# Patient Record
Sex: FEMALE | Race: WHITE | Employment: OTHER | URBAN - METROPOLITAN AREA
[De-identification: names, ages, dates, MRNs, and addresses within clinical notes are randomized per-mention and may not be internally consistent; named-entity substitution may affect disease eponyms.]

---

## 2019-04-16 ENCOUNTER — OFFICE VISIT (OUTPATIENT)
Dept: FAMILY MEDICINE CLINIC | Age: 64
End: 2019-04-16
Payer: MEDICARE

## 2019-04-16 VITALS
TEMPERATURE: 98 F | SYSTOLIC BLOOD PRESSURE: 132 MMHG | BODY MASS INDEX: 35.12 KG/M2 | DIASTOLIC BLOOD PRESSURE: 78 MMHG | RESPIRATION RATE: 20 BRPM | HEART RATE: 112 BPM | WEIGHT: 186 LBS | HEIGHT: 61 IN

## 2019-04-16 DIAGNOSIS — R10.31 RIGHT LOWER QUADRANT ABDOMINAL PAIN: ICD-10-CM

## 2019-04-16 DIAGNOSIS — Z12.11 SCREEN FOR COLON CANCER: ICD-10-CM

## 2019-04-16 DIAGNOSIS — N30.01 ACUTE CYSTITIS WITH HEMATURIA: Primary | ICD-10-CM

## 2019-04-16 DIAGNOSIS — Z00.00 ROUTINE GENERAL MEDICAL EXAMINATION AT A HEALTH CARE FACILITY: ICD-10-CM

## 2019-04-16 DIAGNOSIS — R30.0 DYSURIA: ICD-10-CM

## 2019-04-16 DIAGNOSIS — Z12.31 SCREENING MAMMOGRAM, ENCOUNTER FOR: ICD-10-CM

## 2019-04-16 LAB
ALBUMIN SERPL-MCNC: 3.6 G/DL (ref 3.5–5.1)
ALP BLD-CCNC: 74 U/L (ref 38–126)
ALT SERPL-CCNC: 12 U/L (ref 11–66)
ANION GAP SERPL CALCULATED.3IONS-SCNC: 17 MEQ/L (ref 8–16)
AST SERPL-CCNC: 34 U/L (ref 5–40)
BASOPHILS # BLD: 0.2 %
BASOPHILS ABSOLUTE: 0 THOU/MM3 (ref 0–0.1)
BILIRUB SERPL-MCNC: 0.7 MG/DL (ref 0.3–1.2)
BILIRUBIN, POC: ABNORMAL
BLOOD URINE, POC: ABNORMAL
BUN BLDV-MCNC: 15 MG/DL (ref 7–22)
CALCIUM SERPL-MCNC: 9.5 MG/DL (ref 8.5–10.5)
CHLORIDE BLD-SCNC: 97 MEQ/L (ref 98–111)
CHOLESTEROL, TOTAL: 155 MG/DL (ref 100–199)
CLARITY, POC: ABNORMAL
CO2: 24 MEQ/L (ref 23–33)
COLOR, POC: ABNORMAL
CREAT SERPL-MCNC: 1 MG/DL (ref 0.4–1.2)
EOSINOPHIL # BLD: 0.1 %
EOSINOPHILS ABSOLUTE: 0 THOU/MM3 (ref 0–0.4)
ERYTHROCYTE [DISTWIDTH] IN BLOOD BY AUTOMATED COUNT: 12.9 % (ref 11.5–14.5)
ERYTHROCYTE [DISTWIDTH] IN BLOOD BY AUTOMATED COUNT: 38.5 FL (ref 35–45)
GFR SERPL CREATININE-BSD FRML MDRD: 55 ML/MIN/1.73M2
GLUCOSE BLD-MCNC: 109 MG/DL (ref 70–108)
GLUCOSE URINE, POC: ABNORMAL
HCT VFR BLD CALC: 39.6 % (ref 37–47)
HDLC SERPL-MCNC: 44 MG/DL
HEMOGLOBIN: 12.5 GM/DL (ref 12–16)
IMMATURE GRANS (ABS): 0.1 THOU/MM3 (ref 0–0.07)
IMMATURE GRANULOCYTES: 0.7 %
KETONES, POC: ABNORMAL
LDL CHOLESTEROL CALCULATED: 92 MG/DL
LEUKOCYTE EST, POC: ABNORMAL
LYMPHOCYTES # BLD: 6.9 %
LYMPHOCYTES ABSOLUTE: 1 THOU/MM3 (ref 1–4.8)
MCH RBC QN AUTO: 25.8 PG (ref 26–33)
MCHC RBC AUTO-ENTMCNC: 31.6 GM/DL (ref 32.2–35.5)
MCV RBC AUTO: 81.8 FL (ref 81–99)
MONOCYTES # BLD: 7 %
MONOCYTES ABSOLUTE: 1 THOU/MM3 (ref 0.4–1.3)
NITRITE, POC: POSITIVE
NUCLEATED RED BLOOD CELLS: 0 /100 WBC
PH, POC: 5.5
PLATELET # BLD: 439 THOU/MM3 (ref 130–400)
PMV BLD AUTO: 9 FL (ref 9.4–12.4)
POTASSIUM SERPL-SCNC: 4.5 MEQ/L (ref 3.5–5.2)
PROTEIN, POC: ABNORMAL
RBC # BLD: 4.84 MILL/MM3 (ref 4.2–5.4)
SEG NEUTROPHILS: 85.1 %
SEGMENTED NEUTROPHILS ABSOLUTE COUNT: 12.4 THOU/MM3 (ref 1.8–7.7)
SODIUM BLD-SCNC: 138 MEQ/L (ref 135–145)
SPECIFIC GRAVITY, POC: >=1.03
TOTAL PROTEIN: 7.8 G/DL (ref 6.1–8)
TRIGL SERPL-MCNC: 96 MG/DL (ref 0–199)
UROBILINOGEN, POC: 0.2
WBC # BLD: 14.6 THOU/MM3 (ref 4.8–10.8)

## 2019-04-16 PROCEDURE — 99204 OFFICE O/P NEW MOD 45 MIN: CPT | Performed by: FAMILY MEDICINE

## 2019-04-16 PROCEDURE — 36415 COLL VENOUS BLD VENIPUNCTURE: CPT | Performed by: FAMILY MEDICINE

## 2019-04-16 PROCEDURE — 1123F ACP DISCUSS/DSCN MKR DOCD: CPT | Performed by: FAMILY MEDICINE

## 2019-04-16 PROCEDURE — 4040F PNEUMOC VAC/ADMIN/RCVD: CPT | Performed by: FAMILY MEDICINE

## 2019-04-16 PROCEDURE — 1036F TOBACCO NON-USER: CPT | Performed by: FAMILY MEDICINE

## 2019-04-16 PROCEDURE — G0009 ADMIN PNEUMOCOCCAL VACCINE: HCPCS | Performed by: FAMILY MEDICINE

## 2019-04-16 PROCEDURE — G8400 PT W/DXA NO RESULTS DOC: HCPCS | Performed by: FAMILY MEDICINE

## 2019-04-16 PROCEDURE — G8417 CALC BMI ABV UP PARAM F/U: HCPCS | Performed by: FAMILY MEDICINE

## 2019-04-16 PROCEDURE — 3017F COLORECTAL CA SCREEN DOC REV: CPT | Performed by: FAMILY MEDICINE

## 2019-04-16 PROCEDURE — 81003 URINALYSIS AUTO W/O SCOPE: CPT | Performed by: FAMILY MEDICINE

## 2019-04-16 PROCEDURE — G8427 DOCREV CUR MEDS BY ELIG CLIN: HCPCS | Performed by: FAMILY MEDICINE

## 2019-04-16 PROCEDURE — 90670 PCV13 VACCINE IM: CPT | Performed by: FAMILY MEDICINE

## 2019-04-16 PROCEDURE — 1090F PRES/ABSN URINE INCON ASSESS: CPT | Performed by: FAMILY MEDICINE

## 2019-04-16 RX ORDER — LEVOTHYROXINE SODIUM 88 UG/1
88 TABLET ORAL DAILY
COMMUNITY
Start: 2019-01-17

## 2019-04-16 RX ORDER — NITROFURANTOIN 25; 75 MG/1; MG/1
100 CAPSULE ORAL 2 TIMES DAILY
Qty: 20 CAPSULE | Refills: 0 | Status: SHIPPED | OUTPATIENT
Start: 2019-04-16 | End: 2019-04-26

## 2019-04-16 ASSESSMENT — ENCOUNTER SYMPTOMS
SORE THROAT: 0
VOMITING: 0
CONSTIPATION: 0
DIARRHEA: 0
ABDOMINAL PAIN: 1
NAUSEA: 0
BACK PAIN: 0
RHINORRHEA: 0

## 2019-04-16 ASSESSMENT — PATIENT HEALTH QUESTIONNAIRE - PHQ9
2. FEELING DOWN, DEPRESSED OR HOPELESS: 0
1. LITTLE INTEREST OR PLEASURE IN DOING THINGS: 0
SUM OF ALL RESPONSES TO PHQ QUESTIONS 1-9: 0
SUM OF ALL RESPONSES TO PHQ9 QUESTIONS 1 & 2: 0
SUM OF ALL RESPONSES TO PHQ QUESTIONS 1-9: 0

## 2019-04-16 NOTE — PATIENT INSTRUCTIONS
Patient Education        Pneumococcal Conjugate Vaccine (PCV13): What You Need to Know  Why get vaccinated? Vaccination can protect both children and adults from pneumococcal disease. Pneumococcal disease is caused by bacteria that can spread from person to person through close contact. It can cause ear infections, and it can also lead to more serious infections of the:  · Lungs (pneumonia). · Blood (bacteremia). · Covering of the brain and spinal cord (meningitis). Pneumococcal pneumonia is most common among adults. Pneumococcal meningitis can cause deafness and brain damage, and it kills about 1 child in 10 who get it. Anyone can get pneumococcal disease, but children under 3years of age and adults 72 years and older, people with certain medical conditions, and cigarette smokers are at the highest risk. Before there was a vaccine, the [facility] saw the following in children under 5 each year from pneumococcal disease:  · More than 700 cases of meningitis  · About 13,000 blood infections  · About 5 million ear infections  · About 200 deaths  Since the vaccine became available, severe pneumococcal disease in these children has fallen by 88%. About 18,000 older adults die of pneumococcal disease each year in the [place]. Treatment of pneumococcal infections with penicillin and other drugs is not as effective as it used to be, because some strains of the disease have become resistant to these drugs. This makes prevention of the disease through vaccination even more important. PCV13 vaccine  Pneumococcal conjugate vaccine (called PCV13) protects against 13 types of pneumococcal bacteria. PCV13 is routinely given to children at 2, 4, 6, and 1515 months of age. It is also recommended for children and adults 3to 59years of age with certain health conditions, and for all adults 72years of age and older. Your doctor can give you details.   Some people should not get this vaccine  Anyone who has cause a severe allergic reaction. Such reactions from a vaccine are very rare, estimated at about 1 in a million doses, and would happen within a few minutes to a few hours after the vaccination. As with any medicine, there is a very small chance of a vaccine causing a serious injury or death. The safety of vaccines is always being monitored. For more information, visit: www.cdc.gov/vaccinesafety. What if there is a serious reaction? What should I look for? · Look for anything that concerns you, such as signs of a severe allergic reaction, very high fever, or unusual behavior. Signs of a severe allergic reaction can include hives, swelling of the face and throat, difficulty breathing, a fast heartbeat, dizziness, and weakness, usually within a few minutes to a few hours after the vaccination. What should I do? · If you think it is a severe allergic reaction or other emergency that can't wait, call 911 or get the person to the nearest hospital. Otherwise, call your doctor. · Reactions should be reported to the Vaccine Adverse Event Reporting System (VAERS). Your doctor should file this report, or you can do it yourself through the VAERS website at www.vaers. [facility].gov, or by calling 9-363.423.1780. VAERS does not give medical advice. The National Vaccine Injury Compensation Program  The National Vaccine Injury Compensation Program (VICP) is a federal program that was created to compensate people who may have been injured by certain vaccines. Persons who believe they may have been injured by a vaccine can learn about the program and about filing a claim by calling 4-446.823.8081 or visiting the [employer] website at www.[facility].gov/vaccinecompensation. There is a time limit to file a claim for compensation. How can I learn more? · Ask your healthcare provider. He or she can give you the vaccine package insert or suggest other sources of information. · Call your local or state health department.   · Contact the

## 2019-04-16 NOTE — PROGRESS NOTES
Immunizations     Name Date Dose Route    Pneumococcal 13-valent Conjugate (Reopxcu95) 4/16/2019 0.5 mL Intramuscular    Site: Deltoid- Left    Lot: [id number]    NDC: 9628-8933-46        Venipuncture obtained from right arm. Patient tolerated the procedure without complication or complaint.

## 2019-04-17 NOTE — PROGRESS NOTES
[address] [address]  Dept: [phone]  Dept Fax: [phone]  Loc: [phone]  PROGRESS NOTE      VisitDate: 4/16/2019    Abdirizak Narnajo is a 75 y.o. female who presents today for:     Chief Complaint   Patient presents with   Margie Innocent Doctor     Did not have PCP- Hx of hypothyroidism-Dr. Marlyn Fine    Abdominal Pain     intermit dull at times sharp lower abdominal RLQ>LLQ, pressure, dysuria, frequency, hematuria, denies rectal bleeding, denies fever. Sx for 3 wks, hematuria 2 wks.  Other     due for labs, mammogram, and never had colonoscopy    Immunizations     due to Prevnar         Subjective:  HPI  New patient comes in having right-sided lower quadrant discomfort dysuria urgency and frequency. No fevers or chills. Due for health maintenance measures. Review of Systems   Constitutional: Negative for chills, fatigue and fever. HENT: Negative for congestion, ear pain, postnasal drip, rhinorrhea and sore throat. Cardiovascular: Negative for chest pain, palpitations and leg swelling. Gastrointestinal: Positive for abdominal pain. Negative for constipation, diarrhea, nausea and vomiting. Genitourinary: Positive for dysuria, frequency and urgency. Negative for hematuria. Musculoskeletal: Negative for arthralgias, back pain and joint swelling. Skin: Negative for rash. Neurological: Negative for dizziness, light-headedness and headaches.      Past Medical History:   Diagnosis Date    Hypothyroidism       Past Surgical History:   Procedure Laterality Date    THYROID SURGERY  02/2002    Radioactive iodine      Family History   Problem Relation Age of Onset    Heart Disease Mother     Heart Disease Father      Social History     Tobacco Use    Smoking status: Never Smoker    Smokeless tobacco: Never Used   Substance Use Topics    Alcohol use: Not Currently      Current Outpatient Medications   Medication Sig Dispense Refill    levothyroxine (SYNTHROID) 88 MCG tablet Take 88 mcg by mouth Daily       nitrofurantoin, macrocrystal-monohydrate, (MACROBID) 100 MG capsule Take 1 capsule by mouth 2 times daily for 10 days 20 capsule 0     No current facility-administered medications for this visit. No Known Allergies  Health Maintenance   Topic Date Due    Hepatitis C screen  1955    DTaP/Tdap/Td vaccine (1 - Tdap) 01/31/1972    Diabetes screen  01/31/1993    Breast cancer screen  01/31/2003    Shingles Vaccine (1 of 2) 01/31/2003    Colon cancer screen colonoscopy  01/31/2003    DEXA (modify frequency per FRAX score)  01/31/2018    Flu vaccine (Season Ended) 09/01/2019    Pneumococcal 65+ years Vaccine (2 of 2 - PPSV23) 04/16/2020    Lipid screen  04/16/2024         Objective:     Physical Exam   Constitutional: She is oriented to person, place, and time. She appears well-developed and well-nourished. HENT:   Head: Normocephalic and atraumatic. Right Ear: External ear normal.   Left Ear: External ear normal.   Nose: Nose normal.   Mouth/Throat: Oropharynx is clear and moist. No oropharyngeal exudate. Eyes: Pupils are equal, round, and reactive to light. Conjunctivae and EOM are normal. Right eye exhibits no discharge. Left eye exhibits no discharge. Neck: No thyromegaly present. No carotid bruits   Cardiovascular: Normal rate, regular rhythm, normal heart sounds and intact distal pulses. Exam reveals no gallop and no friction rub. No murmur heard. Pulmonary/Chest: Breath sounds normal. She has no wheezes. She has no rales. Abdominal: Soft. Bowel sounds are normal. She exhibits no distension and no mass. There is tenderness. There is no rebound and no guarding. Mild lower quadrant tenderness left right suprapubic area   Musculoskeletal: Normal range of motion. She exhibits no edema or tenderness.    Lymphadenopathy:     She has no cervical Provider:   Elaina Nelson MD     Requested Specialty:   Gastroenterology     Number of Visits Requested:   1    POCT Urinalysis No Micro (Auto)   For 5 minutes face-to-face time 15 times on counseling regarding health maintenance    Patient giveneducational materials - see patient instructions. Discussed use, benefit, and side effects of prescribed medications. All patient questions answered. Pt voiced understanding. Reviewed health maintenance. Patient agreedwith treatment plan. Follow up as directed. **This report has been created using voice recognition software. It may contain minor errorswhich are inherent in voice recognition technology. **       Electronically signed by Rod Noriega MD on 4/16/2019 at 9:39 PM

## 2019-04-18 LAB
ORGANISM: ABNORMAL
URINE CULTURE, ROUTINE: ABNORMAL

## 2019-04-24 ENCOUNTER — HOSPITAL ENCOUNTER (OUTPATIENT)
Age: 64
Discharge: HOME | End: 2019-04-24
Payer: MEDICARE

## 2019-04-24 ENCOUNTER — OFFICE VISIT (OUTPATIENT)
Dept: FAMILY MEDICINE CLINIC | Age: 64
End: 2019-04-24
Payer: MEDICARE

## 2019-04-24 ENCOUNTER — HOSPITAL ENCOUNTER (OUTPATIENT)
Dept: GENERAL RADIOLOGY | Age: 64
Discharge: HOME | End: 2019-04-24
Payer: MEDICARE

## 2019-04-24 VITALS
BODY MASS INDEX: 34.86 KG/M2 | WEIGHT: 186 LBS | RESPIRATION RATE: 18 BRPM | DIASTOLIC BLOOD PRESSURE: 74 MMHG | SYSTOLIC BLOOD PRESSURE: 124 MMHG | HEART RATE: 66 BPM | TEMPERATURE: 98 F

## 2019-04-24 DIAGNOSIS — R10.30 LOWER ABDOMINAL PAIN: ICD-10-CM

## 2019-04-24 DIAGNOSIS — R10.30 LOWER ABDOMINAL PAIN: Primary | ICD-10-CM

## 2019-04-24 PROCEDURE — 1090F PRES/ABSN URINE INCON ASSESS: CPT | Performed by: FAMILY MEDICINE

## 2019-04-24 PROCEDURE — G8417 CALC BMI ABV UP PARAM F/U: HCPCS | Performed by: FAMILY MEDICINE

## 2019-04-24 PROCEDURE — 3017F COLORECTAL CA SCREEN DOC REV: CPT | Performed by: FAMILY MEDICINE

## 2019-04-24 PROCEDURE — 4040F PNEUMOC VAC/ADMIN/RCVD: CPT | Performed by: FAMILY MEDICINE

## 2019-04-24 PROCEDURE — G8427 DOCREV CUR MEDS BY ELIG CLIN: HCPCS | Performed by: FAMILY MEDICINE

## 2019-04-24 PROCEDURE — G8400 PT W/DXA NO RESULTS DOC: HCPCS | Performed by: FAMILY MEDICINE

## 2019-04-24 PROCEDURE — 99213 OFFICE O/P EST LOW 20 MIN: CPT | Performed by: FAMILY MEDICINE

## 2019-04-24 PROCEDURE — 1123F ACP DISCUSS/DSCN MKR DOCD: CPT | Performed by: FAMILY MEDICINE

## 2019-04-24 PROCEDURE — 74018 RADEX ABDOMEN 1 VIEW: CPT

## 2019-04-24 PROCEDURE — 1036F TOBACCO NON-USER: CPT | Performed by: FAMILY MEDICINE

## 2019-04-24 RX ORDER — CIPROFLOXACIN 500 MG/1
500 TABLET, FILM COATED ORAL 2 TIMES DAILY
Qty: 20 TABLET | Refills: 0 | Status: SHIPPED | OUTPATIENT
Start: 2019-04-24 | End: 2019-04-29

## 2019-04-24 ASSESSMENT — ENCOUNTER SYMPTOMS
BACK PAIN: 0
ABDOMINAL PAIN: 1
RHINORRHEA: 0
DIARRHEA: 0
SORE THROAT: 0
VOMITING: 0
NAUSEA: 0
CONSTIPATION: 0

## 2019-04-25 NOTE — PROGRESS NOTES
Not Currently      Current Outpatient Medications   Medication Sig Dispense Refill    ciprofloxacin (CIPRO) 500 MG tablet Take 1 tablet by mouth 2 times daily for 10 days 20 tablet 0    levothyroxine (SYNTHROID) 88 MCG tablet Take 88 mcg by mouth Daily       nitrofurantoin, macrocrystal-monohydrate, (MACROBID) 100 MG capsule Take 1 capsule by mouth 2 times daily for 10 days 20 capsule 0     No current facility-administered medications for this visit. No Known Allergies  Health Maintenance   Topic Date Due    Hepatitis C screen  1955    DTaP/Tdap/Td vaccine (1 - Tdap) 01/31/1972    Diabetes screen  01/31/1993    Breast cancer screen  01/31/2003    Shingles Vaccine (1 of 2) 01/31/2003    Colon cancer screen colonoscopy  01/31/2003    DEXA (modify frequency per FRAX score)  01/31/2018    Flu vaccine (Season Ended) 09/01/2019    Pneumococcal 65+ years Vaccine (2 of 2 - PPSV23) 04/16/2020    Lipid screen  04/16/2024         Objective:     Physical Exam   Constitutional: She is oriented to person, place, and time. She appears well-developed and well-nourished. No distress. HENT:   Head: Normocephalic and atraumatic. Eyes: Conjunctivae are normal. No scleral icterus. Neck: No JVD present. No thyromegaly present. No bruits   Cardiovascular: Normal rate, regular rhythm and normal heart sounds. Pulmonary/Chest: Effort normal and breath sounds normal. No respiratory distress. She has no wheezes. She has no rales. Abdominal: Soft. She exhibits no mass. There is tenderness. There is no guarding. Suprapubic and lower abdominal tenderness   Musculoskeletal: She exhibits no edema or tenderness. Neurological: She is alert and oriented to person, place, and time. No cranial nerve deficit. Skin: Skin is warm and dry. No rash noted. She is not diaphoretic.      /74 (Site: Left Upper Arm)   Pulse 66   Temp 98 °F (36.7 °C) (Oral)   Resp 18   Wt 186 lb (84.4 kg)   BMI 34.86 kg/m² Impression/Plan:  1. Lower abdominal pain      Requested Prescriptions     Signed Prescriptions Disp Refills    ciprofloxacin (CIPRO) 500 MG tablet 20 tablet 0     Sig: Take 1 tablet by mouth 2 times daily for 10 days     Orders Placed This Encounter   Procedures    XR ABDOMEN (KUB) (SINGLE AP VIEW)     Standing Status:   Future     Number of Occurrences:   1     Standing Expiration Date:   4/24/2020     Order Specific Question:   Reason for exam:     Answer:   lower abd pain   Change antibiotic to Cipro we discussed reasoning possible focal colitis given her symptoms. We'll obtain KUB) for possible constipation    Patient giveneducational materials - see patient instructions. Discussed use, benefit, and side effects of prescribed medications. All patient questions answered. Pt voiced understanding. Reviewed health maintenance. Patient agreedwith treatment plan. Follow up as directed. **This report has been created using voice recognition software. It may contain minor errorswhich are inherent in voice recognition technology. **       Electronically signed by Rodger Tabor MD on 4/24/2019 at 8:37 PM

## 2019-04-29 ENCOUNTER — TELEPHONE (OUTPATIENT)
Dept: FAMILY MEDICINE CLINIC | Age: 64
End: 2019-04-29

## 2019-04-29 ENCOUNTER — APPOINTMENT (OUTPATIENT)
Dept: ULTRASOUND IMAGING | Age: 64
DRG: 660 | End: 2019-04-29
Payer: MEDICARE

## 2019-04-29 ENCOUNTER — APPOINTMENT (OUTPATIENT)
Dept: CT IMAGING | Age: 64
DRG: 660 | End: 2019-04-29
Payer: MEDICARE

## 2019-04-29 ENCOUNTER — HOSPITAL ENCOUNTER (INPATIENT)
Age: 64
LOS: 2 days | Discharge: ANOTHER HOSPITAL | DRG: 660 | End: 2019-05-01
Attending: INTERNAL MEDICINE | Admitting: INTERNAL MEDICINE
Payer: MEDICARE

## 2019-04-29 ENCOUNTER — APPOINTMENT (OUTPATIENT)
Dept: GENERAL RADIOLOGY | Age: 64
DRG: 660 | End: 2019-04-29
Payer: MEDICARE

## 2019-04-29 DIAGNOSIS — N13.30 HYDRONEPHROSIS OF RIGHT KIDNEY: ICD-10-CM

## 2019-04-29 DIAGNOSIS — R19.00 PELVIC MASS: ICD-10-CM

## 2019-04-29 DIAGNOSIS — R62.51 FAILURE TO THRIVE (0-17): Primary | ICD-10-CM

## 2019-04-29 PROBLEM — N39.0 UTI (URINARY TRACT INFECTION): Status: ACTIVE | Noted: 2019-04-29

## 2019-04-29 PROBLEM — N13.9 OBSTRUCTIVE UROPATHY: Status: ACTIVE | Noted: 2019-04-29

## 2019-04-29 PROBLEM — N13.1 HYDRONEPHROSIS DUE TO OBSTRUCTION OF URETER: Status: ACTIVE | Noted: 2019-04-29

## 2019-04-29 LAB
ALBUMIN SERPL-MCNC: 2.9 G/DL (ref 3.5–5.1)
ALP BLD-CCNC: 83 U/L (ref 38–126)
ALT SERPL-CCNC: 11 U/L (ref 11–66)
ANION GAP SERPL CALCULATED.3IONS-SCNC: 21 MEQ/L (ref 8–16)
AST SERPL-CCNC: 44 U/L (ref 5–40)
BASOPHILS # BLD: 0.2 %
BASOPHILS ABSOLUTE: 0 THOU/MM3 (ref 0–0.1)
BILIRUB SERPL-MCNC: 0.7 MG/DL (ref 0.3–1.2)
BILIRUBIN DIRECT: 0.3 MG/DL (ref 0–0.3)
BUN BLDV-MCNC: 38 MG/DL (ref 7–22)
CALCIUM SERPL-MCNC: 9 MG/DL (ref 8.5–10.5)
CHLORIDE BLD-SCNC: 98 MEQ/L (ref 98–111)
CO2: 17 MEQ/L (ref 23–33)
CREAT SERPL-MCNC: 2.6 MG/DL (ref 0.4–1.2)
EKG ATRIAL RATE: 90 BPM
EKG P AXIS: 29 DEGREES
EKG P-R INTERVAL: 128 MS
EKG Q-T INTERVAL: 342 MS
EKG QRS DURATION: 84 MS
EKG QTC CALCULATION (BAZETT): 418 MS
EKG R AXIS: -3 DEGREES
EKG T AXIS: 10 DEGREES
EKG VENTRICULAR RATE: 90 BPM
EOSINOPHIL # BLD: 0.1 %
EOSINOPHILS ABSOLUTE: 0 THOU/MM3 (ref 0–0.4)
ERYTHROCYTE [DISTWIDTH] IN BLOOD BY AUTOMATED COUNT: 14.3 % (ref 11.5–14.5)
ERYTHROCYTE [DISTWIDTH] IN BLOOD BY AUTOMATED COUNT: 40.1 FL (ref 35–45)
GFR SERPL CREATININE-BSD FRML MDRD: 18 ML/MIN/1.73M2
GLUCOSE BLD-MCNC: 132 MG/DL (ref 70–108)
HCT VFR BLD CALC: 34 % (ref 37–47)
HEMOGLOBIN: 10.9 GM/DL (ref 12–16)
IMMATURE GRANS (ABS): 0.24 THOU/MM3 (ref 0–0.07)
IMMATURE GRANULOCYTES: 1.1 %
LACTIC ACID: 2.1 MMOL/L (ref 0.5–2.2)
LIPASE: 61.4 U/L (ref 5.6–51.3)
LYMPHOCYTES # BLD: 4.2 %
LYMPHOCYTES ABSOLUTE: 0.9 THOU/MM3 (ref 1–4.8)
MCH RBC QN AUTO: 25.3 PG (ref 26–33)
MCHC RBC AUTO-ENTMCNC: 32.1 GM/DL (ref 32.2–35.5)
MCV RBC AUTO: 79.1 FL (ref 81–99)
MONOCYTES # BLD: 4.9 %
MONOCYTES ABSOLUTE: 1.1 THOU/MM3 (ref 0.4–1.3)
NUCLEATED RED BLOOD CELLS: 0 /100 WBC
OSMOLALITY CALCULATION: 282.9 MOSMOL/KG (ref 275–300)
PLATELET # BLD: 306 THOU/MM3 (ref 130–400)
PMV BLD AUTO: 8.3 FL (ref 9.4–12.4)
POTASSIUM SERPL-SCNC: 4.1 MEQ/L (ref 3.5–5.2)
RBC # BLD: 4.3 MILL/MM3 (ref 4.2–5.4)
SCAN OF BLOOD SMEAR: NORMAL
SEG NEUTROPHILS: 89.5 %
SEGMENTED NEUTROPHILS ABSOLUTE COUNT: 20.1 THOU/MM3 (ref 1.8–7.7)
SODIUM BLD-SCNC: 136 MEQ/L (ref 135–145)
TOTAL PROTEIN: 7 G/DL (ref 6.1–8)
TROPONIN T: < 0.01 NG/ML
WBC # BLD: 22.5 THOU/MM3 (ref 4.8–10.8)

## 2019-04-29 PROCEDURE — 82248 BILIRUBIN DIRECT: CPT

## 2019-04-29 PROCEDURE — 76856 US EXAM PELVIC COMPLETE: CPT

## 2019-04-29 PROCEDURE — 71045 X-RAY EXAM CHEST 1 VIEW: CPT

## 2019-04-29 PROCEDURE — 87086 URINE CULTURE/COLONY COUNT: CPT

## 2019-04-29 PROCEDURE — 76830 TRANSVAGINAL US NON-OB: CPT

## 2019-04-29 PROCEDURE — 93005 ELECTROCARDIOGRAM TRACING: CPT | Performed by: INTERNAL MEDICINE

## 2019-04-29 PROCEDURE — 87040 BLOOD CULTURE FOR BACTERIA: CPT

## 2019-04-29 PROCEDURE — 83690 ASSAY OF LIPASE: CPT

## 2019-04-29 PROCEDURE — 99285 EMERGENCY DEPT VISIT HI MDM: CPT

## 2019-04-29 PROCEDURE — 85025 COMPLETE CBC W/AUTO DIFF WBC: CPT

## 2019-04-29 PROCEDURE — 2580000003 HC RX 258: Performed by: INTERNAL MEDICINE

## 2019-04-29 PROCEDURE — 74176 CT ABD & PELVIS W/O CONTRAST: CPT

## 2019-04-29 PROCEDURE — 81001 URINALYSIS AUTO W/SCOPE: CPT

## 2019-04-29 PROCEDURE — 36415 COLL VENOUS BLD VENIPUNCTURE: CPT

## 2019-04-29 PROCEDURE — 80053 COMPREHEN METABOLIC PANEL: CPT

## 2019-04-29 PROCEDURE — 83605 ASSAY OF LACTIC ACID: CPT

## 2019-04-29 PROCEDURE — 1200000003 HC TELEMETRY R&B

## 2019-04-29 PROCEDURE — 84484 ASSAY OF TROPONIN QUANT: CPT

## 2019-04-29 RX ORDER — SODIUM CHLORIDE 9 MG/ML
INJECTION, SOLUTION INTRAVENOUS CONTINUOUS
Status: DISCONTINUED | OUTPATIENT
Start: 2019-04-29 | End: 2019-05-01 | Stop reason: HOSPADM

## 2019-04-29 RX ORDER — LEVOTHYROXINE SODIUM 88 UG/1
88 TABLET ORAL DAILY
Status: DISCONTINUED | OUTPATIENT
Start: 2019-04-30 | End: 2019-05-01 | Stop reason: HOSPADM

## 2019-04-29 RX ORDER — HEPARIN SODIUM 5000 [USP'U]/ML
5000 INJECTION, SOLUTION INTRAVENOUS; SUBCUTANEOUS EVERY 12 HOURS SCHEDULED
Status: DISCONTINUED | OUTPATIENT
Start: 2019-04-30 | End: 2019-05-01 | Stop reason: HOSPADM

## 2019-04-29 RX ORDER — SODIUM CHLORIDE 0.9 % (FLUSH) 0.9 %
10 SYRINGE (ML) INJECTION EVERY 12 HOURS SCHEDULED
Status: DISCONTINUED | OUTPATIENT
Start: 2019-04-29 | End: 2019-05-01 | Stop reason: HOSPADM

## 2019-04-29 RX ORDER — SODIUM CHLORIDE 0.9 % (FLUSH) 0.9 %
10 SYRINGE (ML) INJECTION PRN
Status: DISCONTINUED | OUTPATIENT
Start: 2019-04-29 | End: 2019-05-01 | Stop reason: HOSPADM

## 2019-04-29 RX ORDER — ACETAMINOPHEN 325 MG/1
650 TABLET ORAL EVERY 4 HOURS PRN
Status: DISCONTINUED | OUTPATIENT
Start: 2019-04-29 | End: 2019-05-01 | Stop reason: HOSPADM

## 2019-04-29 RX ORDER — 0.9 % SODIUM CHLORIDE 0.9 %
1000 INTRAVENOUS SOLUTION INTRAVENOUS ONCE
Status: COMPLETED | OUTPATIENT
Start: 2019-04-29 | End: 2019-04-29

## 2019-04-29 RX ORDER — 0.9 % SODIUM CHLORIDE 0.9 %
30 INTRAVENOUS SOLUTION INTRAVENOUS ONCE
Status: COMPLETED | OUTPATIENT
Start: 2019-04-29 | End: 2019-04-30

## 2019-04-29 RX ADMIN — SODIUM CHLORIDE 1000 ML: 9 INJECTION, SOLUTION INTRAVENOUS at 23:57

## 2019-04-29 RX ADMIN — Medication 10 ML: at 23:58

## 2019-04-29 RX ADMIN — SODIUM CHLORIDE 1000 ML: 9 INJECTION, SOLUTION INTRAVENOUS at 19:21

## 2019-04-29 ASSESSMENT — PAIN DESCRIPTION - LOCATION
LOCATION: ABDOMEN
LOCATION: ABDOMEN

## 2019-04-29 ASSESSMENT — ENCOUNTER SYMPTOMS
SHORTNESS OF BREATH: 0
WHEEZING: 0
EYE PAIN: 0
BACK PAIN: 0
SORE THROAT: 0
EYE DISCHARGE: 0
ABDOMINAL PAIN: 1
DIARRHEA: 0
VOMITING: 0
RHINORRHEA: 0
COUGH: 0
NAUSEA: 1
ABDOMINAL DISTENTION: 1

## 2019-04-29 ASSESSMENT — PAIN DESCRIPTION - PAIN TYPE
TYPE: ACUTE PAIN
TYPE: ACUTE PAIN

## 2019-04-29 ASSESSMENT — PAIN SCALES - GENERAL
PAINLEVEL_OUTOF10: 0
PAINLEVEL_OUTOF10: 4
PAINLEVEL_OUTOF10: 4

## 2019-04-29 ASSESSMENT — PAIN DESCRIPTION - FREQUENCY: FREQUENCY: CONTINUOUS

## 2019-04-29 ASSESSMENT — PAIN DESCRIPTION - DESCRIPTORS: DESCRIPTORS: ACHING

## 2019-04-29 NOTE — ED NOTES
Pt is resting on cart with call light in reach. IV is started and fluids are infusing. Pt is updated on POC and pending CT scan. Will continue to monitor the patient.      Emelyn Jennings RN  04/29/19 4525

## 2019-04-29 NOTE — TELEPHONE ENCOUNTER
She should probably go to the emergency department. Since this has been going on for quite some time, waiting for a re-authorization is probably not a good idea if she is now vomiting.

## 2019-04-29 NOTE — ED PROVIDER NOTES
[facility]  eMERGENCY dEPARTMENT eNCOUnter          CHIEF COMPLAINT       Chief Complaint   Patient presents with    Urinary Tract Infection       Nurses Notes reviewed and I agree except as noted in the HPI. HISTORY OF PRESENT ILLNESS    Angelica Guevara is a 75 y.o. female who presents to the Emergency Department for the evaluation of nausea and lower abdominal pain with a present UTI diagnosis. The patient states she has been prescribed 2 antibiotics for treatment thus far, because she could not tolerate Macrobid, so she switched to Cipro this Thursday. She states she is being seen by her PCP. She denies having kidney stones. She states she is experiencing a lack of appetite, a \"full\" feeling (bloating), belching, and hiccups. The patient denies experiencing a fever, chills, dysuria (now resolved), and anxiousness. The patient states her last oral intake was soup and jello at 12pm. The patient also notes she takes a thyroid medication. The patient did not state any other complaints or symptoms during my initial encounter. The HPI was provided by the patient. REVIEW OF SYSTEMS     Review of Systems   Constitutional: Positive for appetite change (Decreased eating for the past week). Negative for chills, fatigue and fever. HENT: Negative for congestion, ear pain, rhinorrhea and sore throat. Belching and hiccups   Eyes: Negative for pain, discharge and visual disturbance. Respiratory: Negative for cough, shortness of breath and wheezing. Cardiovascular: Negative for chest pain, palpitations and leg swelling. Gastrointestinal: Positive for abdominal distention (bloating \"full\" sensation), abdominal pain (right lower abdominal pain) and nausea. Negative for diarrhea and vomiting. Genitourinary: Negative for difficulty urinating, dysuria (resolved now), hematuria and vaginal discharge.    Musculoskeletal: Negative for arthralgias, back pain, joint swelling and neck pain.   Skin: Negative for pallor and rash. Neurological: Negative for dizziness, syncope, weakness, light-headedness, numbness and headaches. Hematological: Negative for adenopathy. Psychiatric/Behavioral: Negative for confusion and suicidal ideas. The patient is not nervous/anxious. PAST MEDICAL HISTORY    has a past medical history of Hypothyroidism. SURGICAL HISTORY    has a past surgical history that includes Thyroid surgery (2002). CURRENT MEDICATIONS       Previous Medications    CIPROFLOXACIN (CIPRO) 500 MG TABLET    Take 1 tablet by mouth 2 times daily for 10 days    LEVOTHYROXINE (SYNTHROID) 88 MCG TABLET    Take 88 mcg by mouth Daily        ALLERGIES     has No Known Allergies. FAMILY HISTORY     indicated that her mother is . She indicated that her father is . family history includes Heart Disease in her father and mother. SOCIAL HISTORY      reports that she has never smoked. She has never used smokeless tobacco. She reports that she drank alcohol. PHYSICAL EXAM     INITIAL VITALS:  oral temperature is 98.2 °F (36.8 °C). Her blood pressure is 116/72 and her pulse is 105. Her respiration is 19 and oxygen saturation is 98%. Physical Exam   Constitutional: She is oriented to person, place, and time. She appears well-developed and well-nourished. Non-toxic appearance. HENT:   Head: Normocephalic and atraumatic. Right Ear: Tympanic membrane and external ear normal.   Left Ear: Tympanic membrane and external ear normal.   Nose: Nose normal.   Mouth/Throat: Oropharynx is clear and moist. Mucous membranes are dry. No oropharyngeal exudate, posterior oropharyngeal edema or posterior oropharyngeal erythema. Eyes: Conjunctivae and EOM are normal.   Neck: Normal range of motion. Neck supple. No JVD present. Cardiovascular: Normal rate, regular rhythm, normal heart sounds, intact distal pulses and normal pulses. Exam reveals no gallop and no friction rub. No murmur heard. Pulmonary/Chest: Effort normal and breath sounds normal. No respiratory distress. She has no decreased breath sounds. She has no wheezes. She has no rhonchi. She has no rales. Abdominal: Soft. She exhibits no distension. Bowel sounds are increased. There is generalized tenderness. There is no rebound, no guarding and no CVA tenderness. Musculoskeletal: Normal range of motion. She exhibits no edema. Neurological: She is alert and oriented to person, place, and time. She exhibits normal muscle tone. Coordination normal.   Skin: Skin is warm and dry. No rash noted. She is not diaphoretic. Nursing note and vitals reviewed. DIFFERENTIAL DIAGNOSIS:       DIAGNOSTIC RESULTS     EKG: All EKG's are interpreted by the Emergency Department Physician who either signs or Co-signs this chart in the absence of a cardiologist.  EKG interpreted by Taj Jane MD:    None    RADIOLOGY: non-plain film images(s) such as CT, Ultrasound and MRI are read by the radiologist.    CT ABDOMEN PELVIS WO CONTRAST Additional Contrast? Radiologist Recommendation   Final Result      1. There is a large, 15 x 15 cm lobulated poorly defined heterogeneous mass in the pelvis which could arise from the adnexa or uterus. Findings are concerning for neoplasm. Pelvic ultrasound or MRI is advised. Small amount of abdominal and pelvic    ascites. 2. Mild right hydronephrosis likely related to mechanical obstruction of the distal ureter. **This report has been created using voice recognition software. It may contain minor errors which are inherent in voice recognition technology. **      Final report electronically signed by Dr. Antwan Valverde on 4/29/2019 7:50 PM      XR CHEST PORTABLE   Final Result   Patchy opacity in the right lung base correlate for atelectasis or infiltrate. **This report has been created using voice recognition software.  It may contain minor errors which are inherent in voice

## 2019-04-29 NOTE — TELEPHONE ENCOUNTER
Patient calls back. States that she still has right-sided abd pain. She took her Cipro last night and threw it back up and had dry  Heaves about an hour later. Dr Oksana Stuart had discussed ordering a CT. Please advise.

## 2019-04-30 ENCOUNTER — ANESTHESIA EVENT (OUTPATIENT)
Dept: OPERATING ROOM | Age: 64
DRG: 660 | End: 2019-04-30
Payer: MEDICARE

## 2019-04-30 ENCOUNTER — ANESTHESIA (OUTPATIENT)
Dept: OPERATING ROOM | Age: 64
DRG: 660 | End: 2019-04-30
Payer: MEDICARE

## 2019-04-30 VITALS
OXYGEN SATURATION: 100 % | SYSTOLIC BLOOD PRESSURE: 92 MMHG | TEMPERATURE: 96.8 F | DIASTOLIC BLOOD PRESSURE: 53 MMHG | RESPIRATION RATE: 3 BRPM

## 2019-04-30 LAB
ALBUMIN SERPL-MCNC: 2.6 G/DL (ref 3.5–5.1)
ALP BLD-CCNC: 72 U/L (ref 38–126)
ALT SERPL-CCNC: 9 U/L (ref 11–66)
ANION GAP SERPL CALCULATED.3IONS-SCNC: 16 MEQ/L (ref 8–16)
AST SERPL-CCNC: 38 U/L (ref 5–40)
BACTERIA: ABNORMAL /HPF
BASOPHILS # BLD: 0.2 %
BASOPHILS ABSOLUTE: 0 THOU/MM3 (ref 0–0.1)
BILIRUB SERPL-MCNC: 0.6 MG/DL (ref 0.3–1.2)
BILIRUBIN DIRECT: 0.4 MG/DL (ref 0–0.3)
BILIRUBIN URINE: ABNORMAL
BLOOD, URINE: ABNORMAL
BUN BLDV-MCNC: 39 MG/DL (ref 7–22)
CALCIUM IONIZED: 1.13 MMOL/L (ref 1.12–1.32)
CALCIUM SERPL-MCNC: 8 MG/DL (ref 8.5–10.5)
CASTS 2: ABNORMAL /LPF
CASTS UA: ABNORMAL /LPF
CHARACTER, URINE: ABNORMAL
CHLORIDE BLD-SCNC: 104 MEQ/L (ref 98–111)
CO2: 20 MEQ/L (ref 23–33)
COLOR: YELLOW
CREAT SERPL-MCNC: 2.7 MG/DL (ref 0.4–1.2)
CRYSTALS, UA: ABNORMAL
EOSINOPHIL # BLD: 0.2 %
EOSINOPHILS ABSOLUTE: 0 THOU/MM3 (ref 0–0.4)
EPITHELIAL CELLS, UA: ABNORMAL /HPF
ERYTHROCYTE [DISTWIDTH] IN BLOOD BY AUTOMATED COUNT: 14.2 % (ref 11.5–14.5)
ERYTHROCYTE [DISTWIDTH] IN BLOOD BY AUTOMATED COUNT: 40.6 FL (ref 35–45)
GFR SERPL CREATININE-BSD FRML MDRD: 18 ML/MIN/1.73M2
GLUCOSE BLD-MCNC: 107 MG/DL (ref 70–108)
GLUCOSE URINE: NEGATIVE MG/DL
HCT VFR BLD CALC: 29.1 % (ref 37–47)
HEMOGLOBIN: 9.3 GM/DL (ref 12–16)
ICTOTEST: NEGATIVE
IMMATURE GRANS (ABS): 0.18 THOU/MM3 (ref 0–0.07)
IMMATURE GRANULOCYTES: 1.1 %
KETONES, URINE: ABNORMAL
LACTIC ACID, SEPSIS: 1.3 MMOL/L (ref 0.5–1.9)
LACTIC ACID, SEPSIS: 1.7 MMOL/L (ref 0.5–1.9)
LEUKOCYTE ESTERASE, URINE: ABNORMAL
LYMPHOCYTES # BLD: 4.4 %
LYMPHOCYTES ABSOLUTE: 0.7 THOU/MM3 (ref 1–4.8)
MAGNESIUM: 2 MG/DL (ref 1.6–2.4)
MCH RBC QN AUTO: 25.2 PG (ref 26–33)
MCHC RBC AUTO-ENTMCNC: 32 GM/DL (ref 32.2–35.5)
MCV RBC AUTO: 78.9 FL (ref 81–99)
MISCELLANEOUS 2: ABNORMAL
MONOCYTES # BLD: 5.5 %
MONOCYTES ABSOLUTE: 0.9 THOU/MM3 (ref 0.4–1.3)
NITRITE, URINE: NEGATIVE
NUCLEATED RED BLOOD CELLS: 0 /100 WBC
PH UA: 5 (ref 5–9)
PHOSPHORUS: 3.3 MG/DL (ref 2.4–4.7)
PLATELET # BLD: 233 THOU/MM3 (ref 130–400)
PMV BLD AUTO: 8.1 FL (ref 9.4–12.4)
POTASSIUM REFLEX MAGNESIUM: 4.1 MEQ/L (ref 3.5–5.2)
PROCALCITONIN: 0.61 NG/ML (ref 0.01–0.09)
PROTEIN UA: ABNORMAL
RBC # BLD: 3.69 MILL/MM3 (ref 4.2–5.4)
RBC URINE: ABNORMAL /HPF
RENAL EPITHELIAL, UA: ABNORMAL
SEG NEUTROPHILS: 88.6 %
SEGMENTED NEUTROPHILS ABSOLUTE COUNT: 15.1 THOU/MM3 (ref 1.8–7.7)
SODIUM BLD-SCNC: 140 MEQ/L (ref 135–145)
SPECIFIC GRAVITY, URINE: 1.01 (ref 1–1.03)
TOTAL PROTEIN: 5.8 G/DL (ref 6.1–8)
UROBILINOGEN, URINE: 0.2 EU/DL (ref 0–1)
WBC # BLD: 17 THOU/MM3 (ref 4.8–10.8)
WBC UA: > 100 /HPF
YEAST: ABNORMAL

## 2019-04-30 PROCEDURE — 6360000002 HC RX W HCPCS: Performed by: NURSE PRACTITIONER

## 2019-04-30 PROCEDURE — 3600000013 HC SURGERY LEVEL 3 ADDTL 15MIN: Performed by: UROLOGY

## 2019-04-30 PROCEDURE — 1200000000 HC SEMI PRIVATE

## 2019-04-30 PROCEDURE — 52351 CYSTOURETERO & OR PYELOSCOPE: CPT | Performed by: UROLOGY

## 2019-04-30 PROCEDURE — 2709999900 HC NON-CHARGEABLE SUPPLY: Performed by: UROLOGY

## 2019-04-30 PROCEDURE — 6370000000 HC RX 637 (ALT 250 FOR IP): Performed by: NURSE PRACTITIONER

## 2019-04-30 PROCEDURE — 6360000002 HC RX W HCPCS: Performed by: ANESTHESIOLOGY

## 2019-04-30 PROCEDURE — 93005 ELECTROCARDIOGRAM TRACING: CPT | Performed by: INTERNAL MEDICINE

## 2019-04-30 PROCEDURE — 82330 ASSAY OF CALCIUM: CPT

## 2019-04-30 PROCEDURE — 83605 ASSAY OF LACTIC ACID: CPT

## 2019-04-30 PROCEDURE — 3600000003 HC SURGERY LEVEL 3 BASE: Performed by: UROLOGY

## 2019-04-30 PROCEDURE — 7100000001 HC PACU RECOVERY - ADDTL 15 MIN: Performed by: UROLOGY

## 2019-04-30 PROCEDURE — 99999 PR OFFICE/OUTPT VISIT,PROCEDURE ONLY: CPT | Performed by: NURSE PRACTITIONER

## 2019-04-30 PROCEDURE — 7100000000 HC PACU RECOVERY - FIRST 15 MIN: Performed by: UROLOGY

## 2019-04-30 PROCEDURE — 83735 ASSAY OF MAGNESIUM: CPT

## 2019-04-30 PROCEDURE — 74420 UROGRAPHY RTRGR +-KUB: CPT | Performed by: UROLOGY

## 2019-04-30 PROCEDURE — 99223 1ST HOSP IP/OBS HIGH 75: CPT | Performed by: UROLOGY

## 2019-04-30 PROCEDURE — 0T788DZ DILATION OF BILATERAL URETERS WITH INTRALUMINAL DEVICE, VIA NATURAL OR ARTIFICIAL OPENING ENDOSCOPIC: ICD-10-PCS | Performed by: UROLOGY

## 2019-04-30 PROCEDURE — 99999 PR OFFICE/OUTPT VISIT,PROCEDURE ONLY: CPT | Performed by: UROLOGY

## 2019-04-30 PROCEDURE — 52332 CYSTOSCOPY AND TREATMENT: CPT | Performed by: UROLOGY

## 2019-04-30 PROCEDURE — 2580000003 HC RX 258: Performed by: NURSE PRACTITIONER

## 2019-04-30 PROCEDURE — C2617 STENT, NON-COR, TEM W/O DEL: HCPCS | Performed by: UROLOGY

## 2019-04-30 PROCEDURE — 85025 COMPLETE CBC W/AUTO DIFF WBC: CPT

## 2019-04-30 PROCEDURE — 80076 HEPATIC FUNCTION PANEL: CPT

## 2019-04-30 PROCEDURE — 3700000000 HC ANESTHESIA ATTENDED CARE: Performed by: UROLOGY

## 2019-04-30 PROCEDURE — 3700000001 HC ADD 15 MINUTES (ANESTHESIA): Performed by: UROLOGY

## 2019-04-30 PROCEDURE — 93010 ELECTROCARDIOGRAM REPORT: CPT | Performed by: INTERNAL MEDICINE

## 2019-04-30 PROCEDURE — 2500000003 HC RX 250 WO HCPCS: Performed by: INTERNAL MEDICINE

## 2019-04-30 PROCEDURE — 2580000003 HC RX 258: Performed by: INTERNAL MEDICINE

## 2019-04-30 PROCEDURE — 6360000004 HC RX CONTRAST MEDICATION: Performed by: UROLOGY

## 2019-04-30 PROCEDURE — C1758 CATHETER, URETERAL: HCPCS | Performed by: UROLOGY

## 2019-04-30 PROCEDURE — 2500000003 HC RX 250 WO HCPCS: Performed by: NURSE PRACTITIONER

## 2019-04-30 PROCEDURE — 36415 COLL VENOUS BLD VENIPUNCTURE: CPT

## 2019-04-30 PROCEDURE — 2720000010 HC SURG SUPPLY STERILE: Performed by: UROLOGY

## 2019-04-30 PROCEDURE — 80048 BASIC METABOLIC PNL TOTAL CA: CPT

## 2019-04-30 PROCEDURE — C1769 GUIDE WIRE: HCPCS | Performed by: UROLOGY

## 2019-04-30 PROCEDURE — 86304 IMMUNOASSAY TUMOR CA 125: CPT

## 2019-04-30 PROCEDURE — 2580000003 HC RX 258: Performed by: ANESTHESIOLOGY

## 2019-04-30 PROCEDURE — 84145 PROCALCITONIN (PCT): CPT

## 2019-04-30 PROCEDURE — BT141ZZ FLUOROSCOPY OF KIDNEYS, URETERS AND BLADDER USING LOW OSMOLAR CONTRAST: ICD-10-PCS | Performed by: UROLOGY

## 2019-04-30 PROCEDURE — 6360000002 HC RX W HCPCS: Performed by: INTERNAL MEDICINE

## 2019-04-30 PROCEDURE — 84100 ASSAY OF PHOSPHORUS: CPT

## 2019-04-30 PROCEDURE — 1200000003 HC TELEMETRY R&B

## 2019-04-30 PROCEDURE — 6370000000 HC RX 637 (ALT 250 FOR IP): Performed by: INTERNAL MEDICINE

## 2019-04-30 PROCEDURE — APPSS60 APP SPLIT SHARED TIME 46-60 MINUTES: Performed by: NURSE PRACTITIONER

## 2019-04-30 DEVICE — URETERAL STENT
Type: IMPLANTABLE DEVICE | Status: FUNCTIONAL
Brand: CONTOUR™

## 2019-04-30 RX ORDER — LABETALOL HYDROCHLORIDE 5 MG/ML
5 INJECTION, SOLUTION INTRAVENOUS EVERY 5 MIN PRN
Status: DISCONTINUED | OUTPATIENT
Start: 2019-04-30 | End: 2019-04-30 | Stop reason: HOSPADM

## 2019-04-30 RX ORDER — FENTANYL CITRATE 50 UG/ML
50 INJECTION, SOLUTION INTRAMUSCULAR; INTRAVENOUS EVERY 5 MIN PRN
Status: DISCONTINUED | OUTPATIENT
Start: 2019-04-30 | End: 2019-04-30 | Stop reason: HOSPADM

## 2019-04-30 RX ORDER — MEPERIDINE HYDROCHLORIDE 25 MG/ML
12.5 INJECTION INTRAMUSCULAR; INTRAVENOUS; SUBCUTANEOUS EVERY 5 MIN PRN
Status: DISCONTINUED | OUTPATIENT
Start: 2019-04-30 | End: 2019-04-30 | Stop reason: HOSPADM

## 2019-04-30 RX ORDER — ONDANSETRON 2 MG/ML
4 INJECTION INTRAMUSCULAR; INTRAVENOUS
Status: DISCONTINUED | OUTPATIENT
Start: 2019-04-30 | End: 2019-04-30 | Stop reason: HOSPADM

## 2019-04-30 RX ORDER — DIPHENHYDRAMINE HYDROCHLORIDE 50 MG/ML
12.5 INJECTION INTRAMUSCULAR; INTRAVENOUS
Status: DISCONTINUED | OUTPATIENT
Start: 2019-04-30 | End: 2019-04-30 | Stop reason: HOSPADM

## 2019-04-30 RX ORDER — MAGNESIUM SULFATE IN WATER 40 MG/ML
2 INJECTION, SOLUTION INTRAVENOUS ONCE
Status: COMPLETED | OUTPATIENT
Start: 2019-04-30 | End: 2019-04-30

## 2019-04-30 RX ORDER — PROPOFOL 10 MG/ML
INJECTION, EMULSION INTRAVENOUS PRN
Status: DISCONTINUED | OUTPATIENT
Start: 2019-04-30 | End: 2019-04-30 | Stop reason: SDUPTHER

## 2019-04-30 RX ORDER — HYDRALAZINE HYDROCHLORIDE 20 MG/ML
5 INJECTION INTRAMUSCULAR; INTRAVENOUS EVERY 10 MIN PRN
Status: DISCONTINUED | OUTPATIENT
Start: 2019-04-30 | End: 2019-04-30 | Stop reason: HOSPADM

## 2019-04-30 RX ORDER — FENTANYL CITRATE 50 UG/ML
INJECTION, SOLUTION INTRAMUSCULAR; INTRAVENOUS PRN
Status: DISCONTINUED | OUTPATIENT
Start: 2019-04-30 | End: 2019-04-30 | Stop reason: SDUPTHER

## 2019-04-30 RX ORDER — ONDANSETRON 2 MG/ML
4 INJECTION INTRAMUSCULAR; INTRAVENOUS EVERY 6 HOURS PRN
Status: DISCONTINUED | OUTPATIENT
Start: 2019-04-30 | End: 2019-05-01 | Stop reason: HOSPADM

## 2019-04-30 RX ORDER — ONDANSETRON 2 MG/ML
INJECTION INTRAMUSCULAR; INTRAVENOUS PRN
Status: DISCONTINUED | OUTPATIENT
Start: 2019-04-30 | End: 2019-04-30 | Stop reason: SDUPTHER

## 2019-04-30 RX ORDER — SODIUM CHLORIDE 9 MG/ML
INJECTION, SOLUTION INTRAVENOUS CONTINUOUS PRN
Status: DISCONTINUED | OUTPATIENT
Start: 2019-04-30 | End: 2019-04-30 | Stop reason: SDUPTHER

## 2019-04-30 RX ORDER — LIDOCAINE HYDROCHLORIDE 20 MG/ML
INJECTION, SOLUTION INTRAVENOUS PRN
Status: DISCONTINUED | OUTPATIENT
Start: 2019-04-30 | End: 2019-04-30 | Stop reason: SDUPTHER

## 2019-04-30 RX ORDER — MORPHINE SULFATE 2 MG/ML
2 INJECTION, SOLUTION INTRAMUSCULAR; INTRAVENOUS EVERY 5 MIN PRN
Status: DISCONTINUED | OUTPATIENT
Start: 2019-04-30 | End: 2019-04-30 | Stop reason: HOSPADM

## 2019-04-30 RX ADMIN — SODIUM CHLORIDE: 9 INJECTION, SOLUTION INTRAVENOUS at 15:13

## 2019-04-30 RX ADMIN — METOPROLOL TARTRATE 25 MG: 25 TABLET ORAL at 00:37

## 2019-04-30 RX ADMIN — ACETAMINOPHEN 650 MG: 325 TABLET ORAL at 21:24

## 2019-04-30 RX ADMIN — METRONIDAZOLE 500 MG: 500 INJECTION, SOLUTION INTRAVENOUS at 23:12

## 2019-04-30 RX ADMIN — METRONIDAZOLE 500 MG: 500 INJECTION, SOLUTION INTRAVENOUS at 14:00

## 2019-04-30 RX ADMIN — ONDANSETRON HYDROCHLORIDE 4 MG: 4 INJECTION, SOLUTION INTRAMUSCULAR; INTRAVENOUS at 15:33

## 2019-04-30 RX ADMIN — ONDANSETRON 4 MG: 2 INJECTION INTRAMUSCULAR; INTRAVENOUS at 18:03

## 2019-04-30 RX ADMIN — FENTANYL CITRATE 25 MCG: 50 INJECTION INTRAMUSCULAR; INTRAVENOUS at 15:34

## 2019-04-30 RX ADMIN — SODIUM CHLORIDE: 9 INJECTION, SOLUTION INTRAVENOUS at 19:59

## 2019-04-30 RX ADMIN — METRONIDAZOLE 500 MG: 500 INJECTION, SOLUTION INTRAVENOUS at 01:24

## 2019-04-30 RX ADMIN — CEFEPIME 2 G: 2 INJECTION, POWDER, FOR SOLUTION INTRAVENOUS at 00:37

## 2019-04-30 RX ADMIN — METRONIDAZOLE 500 MG: 500 INJECTION, SOLUTION INTRAVENOUS at 06:29

## 2019-04-30 RX ADMIN — METOPROLOL TARTRATE 12.5 MG: 25 TABLET ORAL at 14:19

## 2019-04-30 RX ADMIN — PHENYLEPHRINE HYDROCHLORIDE 200 MCG: 10 INJECTION INTRAVENOUS at 15:23

## 2019-04-30 RX ADMIN — PROPOFOL 170 MG: 10 INJECTION, EMULSION INTRAVENOUS at 15:17

## 2019-04-30 RX ADMIN — SODIUM CHLORIDE: 9 INJECTION, SOLUTION INTRAVENOUS at 12:36

## 2019-04-30 RX ADMIN — SODIUM CHLORIDE: 9 INJECTION, SOLUTION INTRAVENOUS at 05:25

## 2019-04-30 RX ADMIN — LEVOTHYROXINE SODIUM 88 MCG: 88 TABLET ORAL at 05:25

## 2019-04-30 RX ADMIN — MAGNESIUM SULFATE HEPTAHYDRATE 2 G: 40 INJECTION, SOLUTION INTRAVENOUS at 18:06

## 2019-04-30 RX ADMIN — LIDOCAINE HYDROCHLORIDE 60 MG: 20 INJECTION, SOLUTION INTRAVENOUS at 15:17

## 2019-04-30 RX ADMIN — FENTANYL CITRATE 50 MCG: 50 INJECTION INTRAMUSCULAR; INTRAVENOUS at 15:17

## 2019-04-30 ASSESSMENT — PULMONARY FUNCTION TESTS
PIF_VALUE: 19
PIF_VALUE: 11
PIF_VALUE: 12
PIF_VALUE: 4
PIF_VALUE: 3
PIF_VALUE: 1
PIF_VALUE: 11
PIF_VALUE: 21
PIF_VALUE: 22
PIF_VALUE: 22
PIF_VALUE: 4
PIF_VALUE: 12
PIF_VALUE: 22
PIF_VALUE: 1
PIF_VALUE: 1
PIF_VALUE: 3
PIF_VALUE: 11
PIF_VALUE: 12
PIF_VALUE: 20
PIF_VALUE: 21
PIF_VALUE: 21
PIF_VALUE: 18
PIF_VALUE: 11
PIF_VALUE: 12
PIF_VALUE: 1
PIF_VALUE: 12
PIF_VALUE: 18
PIF_VALUE: 1
PIF_VALUE: 1
PIF_VALUE: 24
PIF_VALUE: 1
PIF_VALUE: 11
PIF_VALUE: 12
PIF_VALUE: 25

## 2019-04-30 ASSESSMENT — PAIN DESCRIPTION - DESCRIPTORS
DESCRIPTORS: ACHING;HEAVINESS
DESCRIPTORS: ACHING

## 2019-04-30 ASSESSMENT — PAIN DESCRIPTION - ORIENTATION
ORIENTATION: LOWER;RIGHT;LEFT
ORIENTATION: RIGHT;LEFT

## 2019-04-30 ASSESSMENT — PAIN - FUNCTIONAL ASSESSMENT
PAIN_FUNCTIONAL_ASSESSMENT: ACTIVITIES ARE NOT PREVENTED
PAIN_FUNCTIONAL_ASSESSMENT: ACTIVITIES ARE NOT PREVENTED

## 2019-04-30 ASSESSMENT — PAIN DESCRIPTION - LOCATION
LOCATION: BACK
LOCATION: ABDOMEN;BACK

## 2019-04-30 ASSESSMENT — PAIN SCALES - GENERAL
PAINLEVEL_OUTOF10: 4
PAINLEVEL_OUTOF10: 0
PAINLEVEL_OUTOF10: 2
PAINLEVEL_OUTOF10: 0
PAINLEVEL_OUTOF10: 0

## 2019-04-30 ASSESSMENT — PAIN DESCRIPTION - ONSET
ONSET: ON-GOING
ONSET: ON-GOING

## 2019-04-30 ASSESSMENT — PAIN DESCRIPTION - PAIN TYPE
TYPE: ACUTE PAIN
TYPE: ACUTE PAIN

## 2019-04-30 ASSESSMENT — PAIN DESCRIPTION - FREQUENCY
FREQUENCY: CONTINUOUS
FREQUENCY: CONTINUOUS

## 2019-04-30 ASSESSMENT — PAIN DESCRIPTION - PROGRESSION
CLINICAL_PROGRESSION: NOT CHANGED
CLINICAL_PROGRESSION: NOT CHANGED

## 2019-04-30 NOTE — FLOWSHEET NOTE
04/30/19 1705   Provider Notification   Reason for Communication Evaluate   Provider Name Dr. Elina Cortez   Provider Notification Physician   Method of Communication Secure Message   Response Waiting for response   Notification Time 21      6:14 am  [phone] From: Grazyna Vicky [facility] [address] RE: Madeline Ken [id number]. Consult for Pelvic mass. Thank you. Unread    6:58am-Dr. Elina Cortez returned page.  Added to list.

## 2019-04-30 NOTE — H&P
INTERNAL MEDICINE SPECIALTIES    History & Physical    Patient:  Abdiirzak Naranjo  YOB: 1955  Date of Service: 4/30/2019  MRN: [id number]   Acct:  [de-identified]   Primary Care Physician: Misti Huerta MD    Chief Complaint: Lower abdominal pain    History of Present Illness:   History obtained from chart review and the patient. The patient is a 75 y.o. female who presents from home with complaints of lower abdominal pain. Context: Patient has apparently been unwell for about a month, when she first noted dysuria along with right RLQ pain with radiation to the suprapubic region. Her urine tested positive at the time and she was started on Macrobid, which was not well tolerated and it was changed to Cipro. The right RLQ pain persisted along with vomiting and dry heaves even while on Cipro and her PCP ordered a CT scan, but in order to avoid the delay of pre-authorization, it was suggested that she presented to the ED. She currently reports anorexia, abdominal bloating with a sense of pelvic fullness, belching and hiccups, but presumed it was due to the medication. She also reports a brief episode of blood spotting but it resolved and it was painless. Significant findings on admission include: Cr 2.7, anion gap 21, WBC 22.5, Hb 10.9, pro-calcitonin 0.61, positive UA and CTAP findings of large 15 x 15 cm lobulated heterogenous mass in the pelvis; mild right hydronephrosis likely related to mechanical distal ureteral obstruction. Past Medical History:        Diagnosis Date    Hypothyroidism     Other disorders of kidney and ureter in diseases classified elsewhere        Past Surgical History:        Procedure Laterality Date    THYROID SURGERY  02/2002    Radioactive iodine        Home Medications:   No current facility-administered medications on file prior to encounter.       Current Outpatient Medications on File Prior to Encounter   Medication Sig Dispense Refill    levothyroxine (SYNTHROID) 88 MCG tablet Take 88 mcg by mouth Daily          Allergies:  Patient has no known allergies. Social History:    reports that she has never smoked. She has never used smokeless tobacco. She reports that she drank alcohol. She reports that she does not use drugs. Family History:       Problem Relation Age of Onset    Heart Disease Mother     Heart Disease Father        Review of systems:  Pertinent positives and negatives as contained in HPI. All other systems reviewed with no clinically significant findings. 10 point review of systems completed, all other than noted above are negative. Vitals:   Vitals:    04/30/19 1642   BP: (!) 177/86   Pulse: 85   Resp: 16   Temp: 98.5 °F (36.9 °C)   SpO2: 91%      BMI: Body mass index is 34.58 kg/m². Exam:  Physical Examination:   General appearance: alert, appears stated age and cooperative  Lungs: clear to auscultation bilaterally  Heart: regular rate and rhythm, S1, S2 normal, no murmur, click, rub or gallop  Abdomen: Soft, non-distended, mild tenderness in RLQ, suprapubic fullness with active bowel sounds.   Extremities: extremities normal, atraumatic, no cyanosis or edema  Pulses: 2+ and symmetric  Skin: Skin color, texture, turgor normal. No rashes or lesions  Neurologic: Grossly normal    Review of Labs and Diagnostic Testing:    Recent Results (from the past 24 hour(s))   Basic Metabolic Panel    Collection Time: 04/29/19  6:34 PM   Result Value Ref Range    Sodium 136 135 - 145 meq/L    Potassium 4.1 3.5 - 5.2 meq/L    Chloride 98 98 - 111 meq/L    CO2 17 (L) 23 - 33 meq/L    Glucose 132 (H) 70 - 108 mg/dL    BUN 38 (H) 7 - 22 mg/dL    CREATININE 2.6 (H) 0.4 - 1.2 mg/dL    Calcium 9.0 8.5 - 10.5 mg/dL   CBC auto differential    Collection Time: 04/29/19  6:34 PM   Result Value Ref Range    WBC 22.5 (H) 4.8 - 10.8 thou/mm3    RBC 4.30 4.20 - 5.40 mill/mm3    Hemoglobin 10.9 (L) 12.0 - 16.0 gm/dl    Hematocrit 34.0 128 ms    QRS Duration 84 ms    Q-T Interval 342 ms    QTc Calculation (Bazett) 418 ms    P Axis 29 degrees    R Axis -3 degrees    T Axis 10 degrees   Urine with Reflexed Micro    Collection Time: 04/29/19 11:50 PM   Result Value Ref Range    Glucose, Ur NEGATIVE NEGATIVE mg/dl    Bilirubin Urine SMALL (A) NEGATIVE    Ketones, Urine TRACE (A) NEGATIVE    Specific Gravity, Urine 1.013 1.002 - 1.03    Blood, Urine LARGE (A) NEGATIVE    pH, UA 5.0 5.0 - 9.0    Protein, UA TRACE (A) NEGATIVE    Urobilinogen, Urine 0.2 0.0 - 1.0 eu/dl    Nitrite, Urine NEGATIVE NEGATIVE    Leukocyte Esterase, Urine LARGE (A) NEGATIVE    Color, UA YELLOW STRAW-YELL    Character, Urine CLOUDY (A) CLEAR-SL C    RBC, UA 3-5 0-2/hpf /hpf    WBC, UA > 100 0-4/hpf /hpf    Epi Cells 15-20 3-5/hpf /hpf    Bacteria, UA MANY FEW/NONE S /hpf    Casts UA 0-4 FINE GRAN NONE SEEN /lpf    Crystals NONE SEEN NONE SEEN    Renal Epithelial, Urine NONE SEEN NONE SEEN    Yeast, UA NONE SEEN NONE SEEN    CASTS 2 NONE SEEN NONE SEEN /lpf    MISCELLANEOUS 2 NONE SEEN    Bile Acids, Total    Collection Time: 04/29/19 11:50 PM   Result Value Ref Range    Ictotest NEGATIVE NEGATIVE   Lactate, Sepsis    Collection Time: 04/30/19  1:35 AM   Result Value Ref Range    Lactic Acid, Sepsis 1.7 0.5 - 1.9 mmol/L   Basic Metabolic Panel w/ Reflex to MG    Collection Time: 04/30/19  7:25 AM   Result Value Ref Range    Sodium 140 135 - 145 meq/L    Potassium reflex Magnesium 4.1 3.5 - 5.2 meq/L    Chloride 104 98 - 111 meq/L    CO2 20 (L) 23 - 33 meq/L    Glucose 107 70 - 108 mg/dL    BUN 39 (H) 7 - 22 mg/dL    CREATININE 2.7 (H) 0.4 - 1.2 mg/dL    Calcium 8.0 (L) 8.5 - 10.5 mg/dL   CBC auto differential    Collection Time: 04/30/19  7:25 AM   Result Value Ref Range    WBC 17.0 (H) 4.8 - 10.8 thou/mm3    RBC 3.69 (L) 4.20 - 5.40 mill/mm3    Hemoglobin 9.3 (L) 12.0 - 16.0 gm/dl    Hematocrit 29.1 (L) 37.0 - 47.0 %    MCV 78.9 (L) 81.0 - 99.0 fL    MCH 25.2 (L) 26.0 - 33.0 pg Value Ref Range    Phosphorus 3.3 2.4 - 4.7 mg/dL       Radiology:     Ct Abdomen Pelvis Wo Contrast Additional Contrast? Radiologist Recommendation    Result Date: 4/29/2019  PROCEDURE: CT ABDOMEN PELVIS WO CONTRAST CLINICAL INFORMATION: Abdominal pain with dysuria . COMPARISON: None. TECHNIQUE: Axial 5 mm CT images were obtained through the abdomen and pelvis. No contrast was given. Coronal reconstructions were obtained. All CT scans at this facility use dose modulation, iterative reconstruction, and/or weight-based dosing when appropriate to reduce radiation dose to as low as reasonably achievable. FINDINGS: Minimal atelectasis in the lung bases. There is a small amount of abdominal and pelvic ascites. Spleen gallbladder adrenal glands liver are unremarkable. Punctate left intrarenal calculus. Mild right hydronephrosis likely related to mechanical obstruction of the distal ureter. There is a large, 15 x 15 cm lobulated poorly defined mass in the pelvis which could arise from the adnexa or uterus. Findings are concerning for neoplasm. Pelvic ultrasound or MRI is advised. Bladder is displaced anteriorly with mild wall thickening which could reflect cystitis. Presacral edema. Scattered stool in the colon. No bowel wall thickening or obstruction. Few colonic diverticuli. Appendix is not seen. No acute osseous findings. Facet hypertrophy and degenerative changes lumbar spine. Mild anterolisthesis L4 on L5 by 4 mm. 1. There is a large, 15 x 15 cm lobulated poorly defined heterogeneous mass in the pelvis which could arise from the adnexa or uterus. Findings are concerning for neoplasm. Pelvic ultrasound or MRI is advised. Small amount of abdominal and pelvic ascites. 2. Mild right hydronephrosis likely related to mechanical obstruction of the distal ureter. **This report has been created using voice recognition software. It may contain minor errors which are inherent in voice recognition technology. ** Final report electronically signed by Dr. Elizabeth Conn on 4/29/2019 7:50 PM    Us Non Ob Transvaginal    Result Date: 4/29/2019  PROCEDURE: US PELVIS COMPLETE, US NON OB TRANSVAGINAL CLINICAL INFORMATION: Pelvic mass with urinary obstructive features. COMPARISON: Abdomen pelvis CT without contrast earlier today TECHNIQUE: Transabdominal and transvaginal pelvic ultrasound was performed. FINDINGS: Transabdominal: Uterus:   Position: anteverted   Size: Enlarged, measures 15 x 5.9 x 2.8 cm and appears to be draped over a complex mass lying in the posterior cul-de-sac described below. Echogenicity/morphology: Grossly homogeneous   Fibroids/mass: None visualized in the uterus   Cervix: Suboptimally visualized   Endometrium: Appears to be of normal thickness measuring 4.5 mm. Ovaries/adnexa:   RIGHT ovary: Questionably visualized. If this is truly the right ovary it appears to be enlarged measuring 5.6 x 3.6 x 3.3 cm and lies posterior to and contiguous with the complex mass described above. LEFT ovary: Normal size measuring 3.3 x 1.5 x 1.9 cm   Mass/cyst: There is a 16.8 x 13.8 x 11.5 cm heterogeneous, complex mass with lobulated polypoid nodular components projecting into a large central cystic component. This appears to lie in the posterior cul-de-sac and displaces the uterus anteriorly. This is most likely of adnexal origin and is consistent with a cystadenocarcinoma until proven otherwise. Blood flow: Blood flow is present in the ovaries by color flow ultrasound. Transvaginal: Uterus:   Not well visualized as the mass in the cul-de-sac described above displaces the uterus anteriorly. Ovaries/adnexa:   Discrete ovaries were not visualized transvaginally. Mass/cyst: The mass described above the transabdominal portion of the study is again seen measuring approximately 18.1 x 11.3 cm with a large central cystic component with irregular walls and nodular excrescences projecting into the cystic component.  This is again worrisome anteriorly. This is most likely of adnexal origin and is consistent with a cystadenocarcinoma until proven otherwise. Blood flow: Blood flow is present in the ovaries by color flow ultrasound. Transvaginal: Uterus:   Not well visualized as the mass in the cul-de-sac described above displaces the uterus anteriorly. Ovaries/adnexa:   Discrete ovaries were not visualized transvaginally. Mass/cyst: The mass described above the transabdominal portion of the study is again seen measuring approximately 18.1 x 11.3 cm with a large central cystic component with irregular walls and nodular excrescences projecting into the cystic component. This is again worrisome for an ovarian cystadenocarcinoma. Free fluid: none Urinary bladder: Unremarkable as visualized. Large, approximately 18.1 x 13.8 x 11.3 cm complex cystic mass in the posterior cul-de-sac, which appears to displace the uterus anteriorly, most likely of ovarian origin and consistent with ovarian cystadenocarcinoma until proven otherwise. If further evaluation is warranted to better clarify the relationship of the mass relative to the uterus, recommend pelvic MRI without and with gadolinium. **This report has been created using voice recognition software. It may contain minor errors which are inherent in voice recognition technology. ** Final report electronically signed by Dr. Nahomi Baptiste on 4/29/2019 11:23 PM    Xr Chest Portable    Result Date: 4/29/2019  PROCEDURE: XR CHEST PORTABLE CLINICAL INFORMATION: Abdominal pain. COMPARISON: No prior study. TECHNIQUE: Portable chest. FINDINGS: Patchy opacity in the right lung base correlate for atelectasis or infiltrate. Costophrenic recesses are preserved. Ectatic thoracic aorta. No acute osseous findings. No cardiomegaly. Patchy opacity in the right lung base correlate for atelectasis or infiltrate. **This report has been created using voice recognition software.  It may contain minor errors which are inherent in voice recognition technology. ** Final report electronically signed by Dr. Wayne Barrios on 4/29/2019 7:21 PM        EKG: normal sinus rhythm, no blocks or conduction defects, no ischemic changes      Principal Problem:    Pelvic mass in female  Active Problems:    Hydronephrosis due to obstruction of ureter    UTI (urinary tract infection)    Obstructive uropathy  Resolved Problems:    * No resolved hospital problems. *        Assessment & Plan:    1. Pelvic Mass  - admit for further evaluation  - Pelvic and Transvaginal US for better characterization and determination of origin of lesion; ovary versus uterus  - Consult Gyn oncology  - Morphine and Zofran for pain control    2. Obstructive uropathy w/ R hydronephrosis  - Urology consult; no indication for Wright  - avoid nephrotoxic agents as much as possible    3. UTI  - start broad spectrum antibiotics  - follow urine cultures    4.  Resume home medications as clinically indicated      DVT prophylaxis: [x] Lovenox                                 [] SCDs                                 [] SQ Heparin                                 [] Encourage ambulation, low risk for DVT, no chemical or mechanical prophylaxis necessary              [] Already on Anticoagulation                Anticipated Disposition upon discharge: [x] Home                                                                         [] Home with Home Health                                                                         [] [facility] facility                                                                         [] [address]          Electronically signed by Galindo Velez MD on 4/30/2019 at 5:27 PM

## 2019-04-30 NOTE — PROGRESS NOTES
Pharmacy Renal Adjustment    Maria Fernanda Montes is a 75 y.o. female. Pharmacy renally adjust the following medications per P&T approved policy: Cefepime    Recent Labs     04/29/19  1834   BUN 38*       Recent Labs     04/29/19  1834   CREATININE 2.6*       Estimated Creatinine Clearance: 21 mL/min (A) (based on SCr of 2.6 mg/dL (H)). Calculated CrCl:    Height:   Ht Readings from Last 1 Encounters:   04/29/19 5' 1\" (1.549 m)     Weight:  Wt Readings from Last 1 Encounters:   04/29/19 183 lb 1.6 oz (83.1 kg)       CKD stage:         Baseline SCr:     Plan: Adjustments based on renal function:  Decrease Cefepime to 2 g q24h IVPB.     Jovanna Churchill [place] 4/29/2019 10:52 PM

## 2019-04-30 NOTE — FLOWSHEET NOTE
04/30/19 0917   Provider Notification   Reason for Communication Evaluate   Provider Name Natalie Chen   Provider Notification Nurse Practitioner   Method of Communication Secure Message   Response Waiting for response   Notification Time [phone]     6:10 am  [phone] From: Blane Yan [facility] RE: Acacia Ken [id number]. Consult for Obstructive uropathy from pelvic mass. Thank you.    Brennan Patrick returned page.  Will add to list.

## 2019-04-30 NOTE — PROGRESS NOTES
Pt admitted to  6K12 from ED and via cart/stretcher. Complaints: abd pain. IV none infusing into the forearm right, condition patent and no redness at a rate of 0 mls/ hour with about 0 mls in the bag still. IV site free of s/s of infection or infiltration. Vital signs obtained. Assessment and data collection initiated. Two nurse skin assessment performed by Mary Berrios RN and Maribell Lopes RN. Oriented to room. Policies and procedures for  explained. All questions answered with no further questions at this time. Fall prevention and safety brochure discussed with patient. Bed alarm on. Call light in reach. The best day to schedule a follow up Dr appointment is:  Monday p.m.

## 2019-04-30 NOTE — ANESTHESIA PRE PROCEDURE
Department of Anesthesiology  Preprocedure Note       Name:  Melissa Garcia   Age:  75 y.o.  :  1955                                          MRN:  [id number]         Date:  2019      Surgeon: Natalia Burden):  Kal London MD    Procedure: CYSTOSCOPY RIGHT RETROGRADE PYELOGRAM URETEROSCOPY, RIGHT STENT PLACEMENT. LEFT RETROGRADE PYELOGRAM, POSS LEFT URETEROSCOPY, POSS LEFT STENT PLACEMENT (N/A Bladder)    Medications prior to admission:   Prior to Admission medications    Medication Sig Start Date End Date Taking?  Authorizing Provider   levothyroxine (SYNTHROID) 88 MCG tablet Take 88 mcg by mouth Daily  19  Yes Historical Provider, MD       Current medications:    Current Facility-Administered Medications   Medication Dose Route Frequency Provider Last Rate Last Dose    magnesium sulfate 2 g in 50 mL IVPB premix  2 g Intravenous Once Tisha Jeffery MD        metoprolol tartrate (LOPRESSOR) tablet 12.5 mg  12.5 mg Oral BID Tisha Jeffery MD   12.5 mg at 19 1419    levothyroxine (SYNTHROID) tablet 88 mcg  88 mcg Oral Daily Tisha Jeffery MD   88 mcg at 19 0525    sodium chloride flush 0.9 % injection 10 mL  10 mL Intravenous 2 times per day Tisha Jeffery MD   10 mL at 19 2358    sodium chloride flush 0.9 % injection 10 mL  10 mL Intravenous PRN Tisha Jeffery MD        0.9 % sodium chloride infusion   Intravenous Continuous Tisha Jeffery  mL/hr at 19 1236      acetaminophen (TYLENOL) tablet 650 mg  650 mg Oral Q4H PRN Tisha Jeffery MD        cefepime (MAXIPIME) 2 g IVPB minibag  2 g Intravenous Q24H Tisah Jeffery MD   Stopped at 19 0110    metronidazole (FLAGYL) 500 mg in NaCl 100 mL IVPB premix  500 mg Intravenous Q8H Tisha Jeffery  mL/hr at 19 1400 500 mg at 19 1400    heparin (porcine) injection 5,000 Units  5,000 Units Subcutaneous 2 times per day Tisha Jeffery MD Allergies:  No Known Allergies    Problem List:    Patient Active Problem List   Diagnosis Code    Pelvic mass in female R19.00    Hydronephrosis due to obstruction of ureter N13.2    UTI (urinary tract infection) N39.0    Obstructive uropathy N13.9       Past Medical History:        Diagnosis Date    Hypothyroidism     Other disorders of kidney and ureter in diseases classified elsewhere        Past Surgical History:        Procedure Laterality Date    THYROID SURGERY  02/2002    Radioactive iodine        Social History:    Social History     Tobacco Use    Smoking status: Never Smoker    Smokeless tobacco: Never Used   Substance Use Topics    Alcohol use: Not Currently                                Counseling given: Not Answered      Vital Signs (Current):   Vitals:    04/30/19 0920 04/30/19 1117 04/30/19 1230 04/30/19 1418   BP: (!) 104/58 (!) 98/55 (!) 102/57 (!) 108/55   Pulse: 78 68 71 71   Resp: 18 16     Temp: 98 °F (36.7 °C) 98 °F (36.7 °C)     TempSrc: Oral Oral     SpO2: 97% 95%     Weight:       Height:                                                  BP Readings from Last 3 Encounters:   04/30/19 (!) 108/55   04/24/19 124/74   04/16/19 132/78       NPO Status:                                                                                 BMI:   Wt Readings from Last 3 Encounters:   04/30/19 183 lb (83 kg)   04/24/19 186 lb (84.4 kg)   04/16/19 186 lb (84.4 kg)     Body mass index is 34.58 kg/m².     CBC:   Lab Results   Component Value Date    WBC 17.0 04/30/2019    RBC 3.69 04/30/2019    HGB 9.3 04/30/2019    HCT 29.1 04/30/2019    MCV 78.9 04/30/2019     04/30/2019       CMP:   Lab Results   Component Value Date     04/30/2019    K 4.1 04/30/2019     04/30/2019    CO2 20 04/30/2019    BUN 39 04/30/2019    CREATININE 2.7 04/30/2019    LABGLOM 18 04/30/2019    GLUCOSE 107 04/30/2019    PROT 5.8 04/30/2019    CALCIUM 8.0 04/30/2019    BILITOT 0.6 04/30/2019    ALKPHOS 72 04/30/2019    AST 38 04/30/2019    ALT 9 04/30/2019       POC Tests: No results for input(s): POCGLU, POCNA, POCK, POCCL, POCBUN, POCHEMO, POCHCT in the last 72 hours. Coags: No results found for: PROTIME, INR, APTT    HCG (If Applicable): No results found for: PREGTESTUR, PREGSERUM, HCG, HCGQUANT     ABGs: No results found for: PHART, PO2ART, HFK2BGM, BKX9YWO, BEART, A1NEGYSH     Type & Screen (If Applicable):  No results found for: LABABO, LABRH    Anesthesia Evaluation    Airway: Mallampati: II       Mouth opening: > = 3 FB Dental:          Pulmonary:       (-) COPD                           Cardiovascular:        (-) CAD    ECG reviewed  Rhythm: regular                      Neuro/Psych:      (-) CVA           GI/Hepatic/Renal:             Endo/Other:    (+) hypothyroidism::., .                 Abdominal:   (+) obese,         Vascular:                                        Anesthesia Plan      general     ASA 2       Induction: intravenous. Anesthetic plan and risks discussed with patient. Plan discussed with CRNA.                   Toyin Gamino MD   4/30/2019

## 2019-04-30 NOTE — PLAN OF CARE
Problem: Discharge Planning:  Goal: Participates in care planning  Description  Participates in care planning  Outcome: Ongoing  Note:   Pt participates in care planning to the best of ability. Will continue to include in care planning. Goal: Discharged to appropriate level of care  Description  Discharged to appropriate level of care  Outcome: Ongoing  Note:   Pt plans to be discharged to home with . Will continue to monitor for further discharge needs. Problem: Bowel Function - Altered:  Goal: Bowel elimination is within specified parameters  Description  Bowel elimination is within specified parameters  Outcome: Ongoing  Note:   Pt stated she had a small BM on 4/29/19. Active bowel sounds heard in all 4 quads. Pt is passing gas. Will continue to monitor. Problem: Cardiac Output - Decreased:  Goal: Hemodynamic stability will improve  Description  Hemodynamic stability will improve  Outcome: Ongoing  Note:   Vitals:    04/29/19 2315   BP: (!) 127/58   Pulse: 143   Resp:    Temp:    SpO2:      Will continue to monitor. Problem: Fluid Volume - Imbalance:  Goal: Absence of imbalanced fluid volume signs and symptoms  Description  Absence of imbalanced fluid volume signs and symptoms  Outcome: Ongoing  Note:   Lung sounds clear. No edema present in extremities. Will continue to monitor. Problem: Nutrition Deficit:  Goal: Ability to achieve adequate nutritional intake will improve  Description  Ability to achieve adequate nutritional intake will improve  Outcome: Ongoing  Note:   Pt states that she has not had much of an appetite in several days. Will continue to monitor and encourage food intake when appropriate. Problem: Pain:  Goal: Pain level will decrease  Description  Pain level will decrease  Outcome: Ongoing  Note:   Pt denied pain this shift. Will continue to monitor and manage pain appropriately.       Problem: Skin Integrity - Impaired:  Goal: Will show no infection signs and

## 2019-04-30 NOTE — CONSULTS
[address] [facility] [facility] [address] RENAL TELEMETRY 6K  [address] [address]  Dept: [phone]  Loc: [phone]  Visit Date: 4/29/2019    Urology Consult Note    Reason for Consult:  Pelvic Mass, Right Hydronephrosis  Requesting Physician:  Dr. Ivet Adams    History Obtained From:  patient, electronic medical record    Chief Complaint: Abdominal Pain, Nausea, UTI    HISTORY OF PRESENT ILLNESS:                The patient is a 75 y.o. female with significant past medical history of hypothyroidism who presented with abdominal pain, nausea, bloating, and recent UTI. She was found to have acute kidney injury and a large pelvic mass and was admitted for further management. Oncology has been consulted for the pelvic mass. Urology was consulted for right hydronephrosis and acute kidney injury. The patient reports that she was being treated as outpatient for a UTI. Urine culture on 4/16/19 grew E. Coli. She was initially started on Macrobid and that made her sick so she was switched to Cipro. She reports the dysuria resolved, however, the abdominal pain increased. She also reports bilateral flank pain R>L. She denies any history of kidney stones. She denies any gross hematuria.     Past Medical History:        Diagnosis Date    Hypothyroidism     Other disorders of kidney and ureter in diseases classified elsewhere      Past Surgical History:        Procedure Laterality Date    THYROID SURGERY  02/2002    Radioactive iodine        Social History     Socioeconomic History    Marital status:      Spouse name: Juan Miguel Woods Number of children: 1    Years of education: Associate Degree    Highest education level: Not on file   Occupational History    Not on file   Social Needs    Financial resource strain: Not on file    Food insecurity:     Worry: Not on file     Inability: Not on file    Transportation needs:     Medical: Not on file     Non-medical: Not on file   Tobacco Use (1.549 m)   Wt 183 lb (83 kg)   SpO2 96%   BMI 34.58 kg/m² . Nursing note and vitals reviewed. Constitutional:    Alert and oriented times 3, no acute distress and cooperative to examination with appropriate mood and affect. HEENT:   Head:         Normocephalic and atraumatic. Mouth/Throat:          Mucous membranes are normal.   Eyes:         EOM are normal. No scleral icterus. Nose:    The external appearance of the nose is normal  Ears: The ears appear normal to external inspection   Neck:         Supple, symmetrical, trachea midline, no adenopathy, thyroid symmetric, not enlarged and no tenderness. Cardiovascular:        Normal rate, regular rhythm, S1 S2 heart sounds. Pulmonary/Chest:       Chest symmetric with normal A/P diameter, no wheezes, rales, or rhonchi noted. Normal respiratory rate and rhthym. No use of accessory muscles. Abdominal:          Soft. No tenderness. Active bowel sounds. Genitalia: External Genitalia shows no irritation or erythema   Urethral Meatus appears to be normal in size and location   Urethra is normal with no tenderness or masses  Musculoskeletal:    Normal range of motion. She exhibits no edema or tenderness of lower extremities. Extremities:    No cyanosis, clubbing, or edema present. Neurological:    Alert and oriented. No cranial nerve deficit. There are no focalizing motor or sensory deficits.     DATA:  CBC:   Lab Results   Component Value Date    WBC 17.0 04/30/2019    RBC 3.69 04/30/2019    HGB 9.3 04/30/2019    HCT 29.1 04/30/2019    MCV 78.9 04/30/2019    MCH 25.2 04/30/2019    MCHC 32.0 04/30/2019     04/30/2019    MPV 8.1 04/30/2019     BMP:    Lab Results   Component Value Date     04/30/2019    K 4.1 04/30/2019     04/30/2019    CO2 20 04/30/2019    BUN 39 04/30/2019    CREATININE 2.7 04/30/2019    CALCIUM 8.0 04/30/2019    LABGLOM 18 04/30/2019    GLUCOSE 107 04/30/2019     BUN/Creatinine:    Lab Results   Component contain minor errors which are inherent in voice recognition technology. **        Final report electronically signed by Dr. Sidney Ashby on 4/29/2019 11:23 PM        Impression       Large, approximately 18.1 x 13.8 x 11.3 cm complex cystic mass in the posterior cul-de-sac, which appears to displace the uterus anteriorly, most likely of ovarian origin and consistent with ovarian cystadenocarcinoma until proven otherwise. If further evaluation is warranted to better clarify the relationship of the mass relative to the uterus, recommend pelvic MRI without and with gadolinium.       **This report has been created using voice recognition software. It may contain minor errors which are inherent in voice recognition technology. **        Final report electronically signed by Dr. Sidney Ashby on 4/29/2019 11:23 PM         IMPRESSION:     Pelvic Mass  Right Hydronephrosis  Bilateral Flank Pain  Acute Kidney Injury  Tachycardia    Plan:      Patient will be added on for surgery today. I discussed this in detail with patient, her , and daughter. We will schedule for cystoscopy, right retrograde pyelogram, right ureteroscopy, right ureteral stent placement, left retrograde pyelogram, possible left ureteroscopy, possible left ureteral stent placement per Dr. Sandra Pelayo later today. NPO. Obtain consent. EKG completed. Thank you for including us in the care of Mickey Rivers, APRN  04/30/19 9:18 AM  Urology    _____________________________________________________________________    I have seen and examined the patient independently on 04/30/2019 and I have reviewed all radiology reports and films along with pertinent laboratory values. I agree with the above assessment and plan with the following additions:      Review of Systems  No problems with ears, nose or throat. No problems with eyes.   No chest pain, shortness of breath, abdominal pain, extremity pain or weakness, and no neurological deficits. No rashes. No swollen glands or lymph nodes.  symptoms per HPI. The remainder of the review of symptoms is negative. Exam  General: alert and oriented. Cooperative. HENT: Normocephalic, Atraumatic. Eyes: No scleral icterus, mucous membranes moist.  Respiratory: Effort normal.   Skin: No rashes or obvious lesions. Radiology  The patient has had a CT scan that I have reviewed along with its accompanying report. The study demonstrates a large pelvic mass with the ureters pushed laterally around the mass. There is mild hydronephrosis on the right. Plan: We will go to the OR and perform bilateral retrogrades, ureteroscopy and stent placement in the hopes that this will improve drainage and her renal function. I described the procedure in detail and also described the associated risks and benefits at length. We discussed possible alternative therapies. We discussed the risks and benefits of not undergoing therapy. Valdo Velez understands these risks and benefits and desires to proceed. She will be scheduled for the procedure today. Thank you for the opportunity of allowing us to participate in Aislinn's care.       Edith Wood    [phone]

## 2019-04-30 NOTE — FLOWSHEET NOTE
Advanced Directives completed and put in the file. 04/30/19 0759   Encounter Summary   Services provided to: Patient and family together   Referral/Consult From: [address] [place] [address]; Children   Continue Visiting Yes  (4/30 AD completed.)   Complexity of Encounter Moderate   Length of Encounter 30 minutes   Spiritual/Worship   Type Spiritual support   Assessment Approachable;Calm   Intervention Active listening;Empowerment;Sustaining presence/ Ministry of presence   Outcome Connection/belonging;Expressed gratitude;Encouraged; Hopeful;Receptive   Advance Directives (For Healthcare)   Healthcare Directive Yes, patient has an advance directive for healthcare treatment

## 2019-04-30 NOTE — FLOWSHEET NOTE
04/29/19 2326   Provider Notification   Reason for Communication Evaluate   Provider Name Dr. Pedro Arenas   Provider Notification Physician   Method of Communication Secure Message   Response Waiting for response   Notification Time 2326     Dr. Pedro Arenas paged to pt's HR in 140s on monitor. /58, MAP 76.    2328-Dr. Monroy returned page. Orders for 25mg lopressor PO once. 2343-This RN paged Dr. Pedro Arenas to clarify if he would like the bolus running for the pt.    2344-Dr. Pedro Arenas returned page and is this a correct order and to give fluid bolus.

## 2019-04-30 NOTE — PROGRESS NOTES
1549 ARRIVED IN PACU FROM O R AFTER GENERAL ANESTHESIA. SEE FLOW SHEET. 1610 DOZES COMFORTABLY. REPORT CALLED TO AALIYAH SANTA. [address] NOTIFIED . [address] TO ROOM PER TRANSPORT IN STABLE CONDITION.

## 2019-04-30 NOTE — PROGRESS NOTES
[facility]      Progress Note  [facility] Renal Telemetry 6K       Patient: Pietro Ken  Unit/Bed: 9U-06/767-J  YOB: 1955  MRN: [id number]  Acct: [de-identified]   Admitting Diagnosis: Pelvic mass in female [R19.00]  Admit Date:  4/29/2019  Hospital Day: 1    Interval History:    Patient is on admission for evaluation of a pelvis mass which is also causing obstructive uropathy leading to a UTI. She is currently being treated with Iv antibiotics pending further evaluation of the pelvic mass. The following Services are consulting: Urology, Gyn, Oncology     Chart, Labs, Radiology studies, and Consults reviewed. Subjective:  Patient seen and examined. She had runs of tachycardia over the night, responded to fluid bolus and metoprolol. There are no new complaints today. Objective:   BP (!) 177/86   Pulse 85   Temp 98.5 °F (36.9 °C) (Oral)   Resp 16   Ht 5' 1\" (1.549 m)   Wt 183 lb (83 kg)   SpO2 91%   BMI 34.58 kg/m²       Intake/Output Summary (Last 24 hours) at 4/30/2019 1744  Last data filed at 4/30/2019 1643  Gross per 24 hour   Intake 792.69 ml   Output 500 ml   Net 292.69 ml        Physical Examination:   General appearance: alert, appears stated age and cooperative  Lungs: clear to auscultation bilaterally  Heart: regular rate and rhythm, S1, S2 normal, no murmur, click, rub or gallop  Abdomen: Soft, non-distended, mild tenderness in RLQ, suprapubic fullness with active bowel sounds.   Extremities: extremities normal, atraumatic, no cyanosis or edema  Pulses: 2+ and symmetric  Skin: Skin color, texture, turgor normal. No rashes or lesions  Neurologic: Grossly normal        Wright:  Drains:  Central Line/port:   EKG:  Imaging:      Scheduled Meds:   magnesium sulfate  2 g Intravenous Once    metoprolol tartrate  12.5 mg Oral BID    levothyroxine  88 mcg Oral Daily    sodium chloride flush  10 mL Intravenous 2 times per day    cefepime  2 g Intravenous Q24H tachycardia  - etiology unclear; likely due to electrolyte derangement  - started Metoprolol 12.5 mg; will titrate as tolerated  - monitor serum electrolytes and replace as indicated    5.  Hypothyroidism  - resume home medications as clinically indicated  - check free T4 levels    Electronically signed by Viviane Carlson MD on 4/30/2019 at 5:44 PM    Attending Physician

## 2019-05-01 VITALS
OXYGEN SATURATION: 96 % | HEART RATE: 68 BPM | RESPIRATION RATE: 16 BRPM | HEIGHT: 61 IN | TEMPERATURE: 97.5 F | WEIGHT: 183 LBS | DIASTOLIC BLOOD PRESSURE: 58 MMHG | SYSTOLIC BLOOD PRESSURE: 103 MMHG | BODY MASS INDEX: 34.55 KG/M2

## 2019-05-01 LAB
ALBUMIN SERPL-MCNC: 2.4 G/DL (ref 3.5–5.1)
ALP BLD-CCNC: 78 U/L (ref 38–126)
ALT SERPL-CCNC: 10 U/L (ref 11–66)
ANION GAP SERPL CALCULATED.3IONS-SCNC: 13 MEQ/L (ref 8–16)
AST SERPL-CCNC: 47 U/L (ref 5–40)
BASOPHILS # BLD: 0.2 %
BASOPHILS ABSOLUTE: 0 THOU/MM3 (ref 0–0.1)
BILIRUB SERPL-MCNC: 0.8 MG/DL (ref 0.3–1.2)
BILIRUBIN DIRECT: 0.5 MG/DL (ref 0–0.3)
BUN BLDV-MCNC: 38 MG/DL (ref 7–22)
CA 125: 338 U/ML
CALCIUM SERPL-MCNC: 7.7 MG/DL (ref 8.5–10.5)
CHLORIDE BLD-SCNC: 106 MEQ/L (ref 98–111)
CO2: 16 MEQ/L (ref 23–33)
CREAT SERPL-MCNC: 3.2 MG/DL (ref 0.4–1.2)
EKG ATRIAL RATE: 51 BPM
EKG Q-T INTERVAL: 320 MS
EKG QRS DURATION: 84 MS
EKG QTC CALCULATION (BAZETT): 467 MS
EKG R AXIS: -7 DEGREES
EKG T AXIS: 22 DEGREES
EKG VENTRICULAR RATE: 128 BPM
EOSINOPHIL # BLD: 0.1 %
EOSINOPHILS ABSOLUTE: 0 THOU/MM3 (ref 0–0.4)
ERYTHROCYTE [DISTWIDTH] IN BLOOD BY AUTOMATED COUNT: 14.4 % (ref 11.5–14.5)
ERYTHROCYTE [DISTWIDTH] IN BLOOD BY AUTOMATED COUNT: 43.7 FL (ref 35–45)
GFR SERPL CREATININE-BSD FRML MDRD: 14 ML/MIN/1.73M2
GLUCOSE BLD-MCNC: 113 MG/DL (ref 70–108)
HCT VFR BLD CALC: 29.5 % (ref 37–47)
HEMOGLOBIN: 8.9 GM/DL (ref 12–16)
IMMATURE GRANS (ABS): 0.16 THOU/MM3 (ref 0–0.07)
IMMATURE GRANULOCYTES: 0.9 %
LYMPHOCYTES # BLD: 2.4 %
LYMPHOCYTES ABSOLUTE: 0.4 THOU/MM3 (ref 1–4.8)
MCH RBC QN AUTO: 25.1 PG (ref 26–33)
MCHC RBC AUTO-ENTMCNC: 30.2 GM/DL (ref 32.2–35.5)
MCV RBC AUTO: 83.3 FL (ref 81–99)
MONOCYTES # BLD: 1.8 %
MONOCYTES ABSOLUTE: 0.3 THOU/MM3 (ref 0.4–1.3)
NUCLEATED RED BLOOD CELLS: 0 /100 WBC
ORGANISM: ABNORMAL
ORGANISM: ABNORMAL
PLATELET # BLD: 177 THOU/MM3 (ref 130–400)
PMV BLD AUTO: 8.1 FL (ref 9.4–12.4)
POTASSIUM REFLEX MAGNESIUM: 4.2 MEQ/L (ref 3.5–5.2)
RBC # BLD: 3.54 MILL/MM3 (ref 4.2–5.4)
SEG NEUTROPHILS: 94.6 %
SEGMENTED NEUTROPHILS ABSOLUTE COUNT: 16.4 THOU/MM3 (ref 1.8–7.7)
SODIUM BLD-SCNC: 135 MEQ/L (ref 135–145)
T4 FREE: 1.11 NG/DL (ref 0.93–1.76)
TOTAL PROTEIN: 5.6 G/DL (ref 6.1–8)
URINE CULTURE REFLEX: ABNORMAL
URINE CULTURE REFLEX: ABNORMAL
WBC # BLD: 17.3 THOU/MM3 (ref 4.8–10.8)

## 2019-05-01 PROCEDURE — 36415 COLL VENOUS BLD VENIPUNCTURE: CPT

## 2019-05-01 PROCEDURE — 2580000003 HC RX 258: Performed by: NURSE PRACTITIONER

## 2019-05-01 PROCEDURE — 6370000000 HC RX 637 (ALT 250 FOR IP): Performed by: NURSE PRACTITIONER

## 2019-05-01 PROCEDURE — 80076 HEPATIC FUNCTION PANEL: CPT

## 2019-05-01 PROCEDURE — 2500000003 HC RX 250 WO HCPCS: Performed by: NURSE PRACTITIONER

## 2019-05-01 PROCEDURE — 80048 BASIC METABOLIC PNL TOTAL CA: CPT

## 2019-05-01 PROCEDURE — 84439 ASSAY OF FREE THYROXINE: CPT

## 2019-05-01 PROCEDURE — 93010 ELECTROCARDIOGRAM REPORT: CPT | Performed by: INTERNAL MEDICINE

## 2019-05-01 PROCEDURE — 6360000002 HC RX W HCPCS: Performed by: NURSE PRACTITIONER

## 2019-05-01 PROCEDURE — 85025 COMPLETE CBC W/AUTO DIFF WBC: CPT

## 2019-05-01 PROCEDURE — 99232 SBSQ HOSP IP/OBS MODERATE 35: CPT | Performed by: NURSE PRACTITIONER

## 2019-05-01 RX ADMIN — SODIUM CHLORIDE: 9 INJECTION, SOLUTION INTRAVENOUS at 05:46

## 2019-05-01 RX ADMIN — ACETAMINOPHEN 650 MG: 325 TABLET ORAL at 12:23

## 2019-05-01 RX ADMIN — LEVOTHYROXINE SODIUM 88 MCG: 88 TABLET ORAL at 06:28

## 2019-05-01 RX ADMIN — CEFEPIME 2 G: 2 INJECTION, POWDER, FOR SOLUTION INTRAVENOUS at 00:12

## 2019-05-01 RX ADMIN — METRONIDAZOLE 500 MG: 500 INJECTION, SOLUTION INTRAVENOUS at 14:54

## 2019-05-01 RX ADMIN — METRONIDAZOLE 500 MG: 500 INJECTION, SOLUTION INTRAVENOUS at 06:28

## 2019-05-01 ASSESSMENT — PAIN DESCRIPTION - LOCATION: LOCATION: BACK;BUTTOCKS

## 2019-05-01 ASSESSMENT — PAIN - FUNCTIONAL ASSESSMENT: PAIN_FUNCTIONAL_ASSESSMENT: ACTIVITIES ARE NOT PREVENTED

## 2019-05-01 ASSESSMENT — PAIN DESCRIPTION - DESCRIPTORS: DESCRIPTORS: ACHING

## 2019-05-01 ASSESSMENT — PAIN DESCRIPTION - PAIN TYPE: TYPE: ACUTE PAIN

## 2019-05-01 ASSESSMENT — PAIN DESCRIPTION - ORIENTATION: ORIENTATION: LOWER

## 2019-05-01 ASSESSMENT — PAIN SCALES - GENERAL
PAINLEVEL_OUTOF10: 0
PAINLEVEL_OUTOF10: 3
PAINLEVEL_OUTOF10: 0

## 2019-05-01 ASSESSMENT — PAIN DESCRIPTION - ONSET: ONSET: GRADUAL

## 2019-05-01 NOTE — ANESTHESIA POSTPROCEDURE EVALUATION
Department of Anesthesiology  Postprocedure Note    Patient: Arminda Rosales  MRN: [id number]  YOB: 1955  Date of evaluation: 5/1/2019  Time:  7:55 AM     Procedure Summary     Date:  04/30/19 Room / Location:  [facility] MARVIN Mendoza    Anesthesia Start:  0296 Anesthesia Stop:  5952    Procedure:  CYSTOSCOPY, BILATERAL RETROGRADE PYELOGRAM, BILATERAL URETEROSCOPY, BILATERAL STENT PLACEMENT (Bilateral Bladder) Diagnosis:  (PELVIS MASS, UTI)    Surgeon:  Michelle Izaguirre MD Responsible Provider:  Robert Gordillo MD    Anesthesia Type:  general ASA Status:  2          Anesthesia Type: general    Matt Phase I: Matt Score: 10    Matt Phase II:      Last vitals: Reviewed and per EMR flowsheets. Anesthesia Post Evaluation [facility]  POST-ANESTHESIA NOTE       Name:  Arminda Rosales                                         Age:  75 y.o. MRN:  [id number]      Last Vitals:  /62   Pulse 69   Temp 97.7 °F (36.5 °C) (Oral)   Resp 18   Ht 5' 1\" (1.549 m)   Wt 183 lb (83 kg)   SpO2 93%   BMI 34.58 kg/m²   No data found.     Level of Consciousness:  Awake    Respiratory:  Stable    Oxygen Saturation:  Stable    Cardiovascular:  Stable    Hydration:  Adequate    PONV:  Stable    Post-op Pain:  Adequate analgesia    Post-op Assessment:  No apparent anesthetic complications    Additional Follow-Up / Treatment / Comment:  None    Robert Gordillo MD  May 1, 2019   7:56 AM\

## 2019-05-01 NOTE — PLAN OF CARE
Problem: Nutrition  Goal: Optimal nutrition therapy  Outcome: Ongoing  Nutrition Problem: Inadequate oral intake  Intervention: Food and/or Nutrient Delivery: Continue current diet, Start ONS  Nutritional Goals: Pt will consume 75% or more of meals during LOS.

## 2019-05-01 NOTE — CONSULTS
[address] [place] [address]                                  CONSULTATION    PATIENT NAME: Claire ROJAS           :        1955  MED REC NO:   [id number]                           ROOM:       0012  ACCOUNT NO:   [de-identified]                           ADMIT DATE: 2019  PROVIDER:     Adolph Noguera. Dominique Arce M.D.    Jacquie Rosenbaumm:  2019    MEDICAL ONCOLOGY CONSULT    REFERRING PROVIDER:  Santos Aviles. Silverio Valencia MD    REASON FOR CONSULTATION:  The patient with pelvic mass, possible ovarian  cancer. HISTORY OF PRESENT ILLNESS:  The patient is a 75years old pleasant  female who presented on this admission to [address] [place] [address] with  a complaint of lower abdominal pain. The patient has been unwell for  about a month when she first noted dysuria along with right lower  quadrant abdominal pain and radiation to the suprapubic region. The  patient was tested positive for urinary tract infection and was started  on Macrobid by her family physician. She did not get enough relief,  therefore, her antibiotic was changed to Cipro. The patient had  persistent right lower quadrant pain along with vomiting and dry heaves  while on Cipro. The patient was ordered a CT scan of the abdomen and  pelvis, which was abnormal with findings of large 15 x 15 cm lobulated  heterogenous mass in the pelvis, mild right hydronephrosis was also  noted, which could be secondary to mechanical distal ureteral  obstruction. No history of any fever. PAST MEDICAL HISTORY:  Positive for hypothyroidism. PAST SURGICAL HISTORY:  Thyroid surgery. SOCIAL HISTORY:  The patient has no history of any use or abuse of  tobacco and recreational drugs. She occasionally drinks alcohol. FAMILY HISTORY: No history of any ovarian cancer or breast cancer. Positive for heart disease in mother and father.     REVIEW OF SYSTEMS:  As mentioned in the history of present illness. ALLERGIES:  The patient has no known drug allergies. CURRENT MEDICATIONS:  Lopressor, heparin 5000 units subcu injection two  times a day, Synthroid, Maxipime, Flagyl. The patient's p.r.n.  medications, Zofran and Tylenol. PHYSICAL EXAMINATION:  GENERAL:  The patient is a moderately built female, looks sick and  tired, but not in any apparent distress at this time. VITAL SIGNS:  Temperature 98.1, respiratory rate 18, pulse 78, blood  pressure 96/54, pulse oximetry 90% on room air. Pain level 4/10. HEENT:  Head:  Atraumatic, normocephalic. Eyes:  Extraocular movements  are intact. Conjunctivae are pale, nonicteric. Mouth:  Oral mucosa is  moist.  Tongue is mildly coated. No oral thrush. NECK:  Supple. No JVD. CHEST:  Bilateral air entry is present. HEART:  S1, S2. No gallop. ABDOMEN:  Soft, mildly tender in the lower abdomen in the suprapubic  area. EXTREMITIES:  No edema. Pulses are present. NEURO:  Sensory and motor functions are grossly intact. Cranial nerves  II through XII are grossly intact. LABORATORY DATA:  WBC 17, hemoglobin 9.3, hematocrit 29.1, MCV 78.9,  RDW-CV 14.2, platelet count 090. Sodium 140, potassium 4.1, BUN 39,  creatinine 2.7. Estimated GFR 18. Total protein 5.8. albumin 2.6, alk  phos 72, ALT 9, AST 38, bilirubin 0.6. ASSESSMENT:  The patient is seen for evaluation of her pelvic mass. The  patient presented on this admission with lower abdominal pain in the  suprapubic area. The patient's CT scan of the abdomen and pelvis  revealed a large 15 x 15 cm lobulated, poorly-defined heterogenous mass  in the pelvis, which could arise from the adnexa or uterus. Findings  are concerning for neoplasm. Pelvic ultrasound or MRI is advised. Small amount of abdominal and pelvic ascites present. Mild right  hydronephrosis, likely related to mechanical obstruction of the distal  ureter.   The patient also underwent transvaginal ultrasound examination,  which revealed large approximately 18.1 x 13.8 x 11.3 cm complex cystic  mass in the posterior cul-de-sac, which appears to displace the uterus  anteriorly, most likely of ovarian origin and consistent with ovarian  cystadenocarcinoma until proven otherwise. The patient underwent  cystoscopy with right retrograde pyelogram, ureteroscopy, right stent  placement, left retrograde pyelogram, possible left ureteroscopy,  possible left stent placement today by Dr. Riddhi Lemus. After seeing the  patient, I explained the clinical situation and we do not have a  clinical gynecologic oncologist for possible resection of the tumor. The patient and her family requested for a transfer to a tertiary  center, namely, [address] [place] [address]. PLAN:  After discussing with the patient and her  and some of her  family members, it was decided that the patient elected to transfer to  [facility]. Therefore, a call is placed  to [address] [place] [address] for transfer of the patient depending on the  availability of the bed. They said they will call for the transfer. The patient will be monitored carefully during her acute illness. Thank you Dr. Silverio Valencia for the interesting consult on this pleasant woman  with multiple advanced medical problems. Please do not hesitate to call  with any questions or concerns if I can be of any further assistance.         Saintclair Hocking, M.D.    D: 04/30/2019 21:27:35       T: 04/30/2019 22:23:03     [email or web address]  Job#: [id number]     Doc#: [id number]    CC:

## 2019-05-01 NOTE — PLAN OF CARE
Problem: Discharge Planning:  Goal: Participates in care planning  Description  Participates in care planning  Outcome: Ongoing  Note:   Pt participates in care planning to the best of ability. Will continue to include in care planning. Goal: Discharged to appropriate level of care  Description  Discharged to appropriate level of care  Outcome: Ongoing  Note:   Pt will be transferred to [facility] for further evaluation of pelvic mass. Will continue to monitor. Problem: Bowel Function - Altered:  Goal: Bowel elimination is within specified parameters  Description  Bowel elimination is within specified parameters  Outcome: Ongoing  Note:   Pt had one small BM this shift. Pt is passing gas and has active bowel sounds in all 4 quads. Pt is slowly getting appetite back. Will continue to monitor. Problem: Cardiac Output - Decreased:  Goal: Hemodynamic stability will improve  Description  Hemodynamic stability will improve  Outcome: Ongoing  Note:   Vitals:    04/30/19 2309   BP: (!) 92/54   Pulse: 69   Resp: 18   Temp: 98.2 °F (36.8 °C)   SpO2: 93%     Continuing to monitor. Problem: Fluid Volume - Imbalance:  Goal: Absence of imbalanced fluid volume signs and symptoms  Description  Absence of imbalanced fluid volume signs and symptoms  Outcome: Ongoing  Note:   Fluids at 100ml/hr, pt is trying to void, but has only had low output. No crackles noted in lungs and no edema present in extremities. Will continue to monitor. Problem: Nutrition Deficit:  Goal: Ability to achieve adequate nutritional intake will improve  Description  Ability to achieve adequate nutritional intake will improve  Outcome: Ongoing  Note:   Pt states she is slowly getting appetite back. Will continue to monitor. Problem: Pain:  Goal: Pain level will decrease  Description  Pain level will decrease  Outcome: Ongoing  Note:   Pt rated pain 4 on scale 0-10. Pain mediation on MAR and repositioned for comfort.  Will continue to

## 2019-05-01 NOTE — FLOWSHEET NOTE
04/30/19 2009   Provider Notification   Reason for Communication Evaluate   Provider Name Dr. Mik Yanez   Provider Notification Physician   Method of Communication Secure Message   Response En route   Notification Time 2009     8:09 pm  [phone] From: Kiran Payne [facility] Unit [address]K RE: Abebe Concepciónamarilis Ken [id number]. Pt and family are wondering if you will be stopping by to talk to them while they are here. Thanks! 9:45 pm  Ok so she doesn't want ureteral stent here? 9:49 pm  Two stents were placed today, there was a pelvic mass near her ovaries. She needs to see a gynecologic oncologist and pt and family thought it would be better for her to be transferred to the [facility]. Read 9:49 pm   9:50 pm  Ok thanks for letting us know.

## 2019-05-01 NOTE — FLOWSHEET NOTE
04/30/19 2144   Provider Notification   Reason for Communication Evaluate   Provider Name Tyree Cueva   Provider Notification Nurse Practitioner   Method of Communication Secure Message   Response Waiting for response   Notification Time 2144     9:44 pm  [phone] From: Randi Shanks [facility] [address] RE: Ashlee Ken [id number]. Pt admitted with pelvic mass. Dr. Meghna Schneider talked to this family and they have decided to transfer pt to [facility]. Thank you.   Unread

## 2019-05-01 NOTE — PROGRESS NOTES
Nutrition Assessment    Type and Reason for Visit: Initial, Positive Nutrition Screen(poor oral intake, unplanned weight loss)    Nutrition Recommendations:   Continue current diet. ONS initiated, Ensure Enlive TID, continue. Consider a multivitamin and a probiotic. Nutrition Assessment:   Pt. nutritionally compromised AEB reports of poor appetite/intake in the last week and unplanned weight loss. At risk for further nutrition compromise r/t continued poor appetite, intermittent nausea, and newly found pelvic mass concerning for neoplasm per MD notes, and need for nutrition support. Will provide Ensure Enlive TID. Encouraged oral intake and continued home ONS use as needed. Will recommend consider a multivitamin and a probiotic (with recent antibiotic use). Malnutrition Assessment:  · Malnutrition Status: At risk for malnutrition  · Context: Acute illness or injury    Nutrition Risk Level: Moderate    Nutrient Needs:  · Estimated Daily Total Kcal: 2999-8143 kcals (15-18 kcals/kg/day based on 83 kg)   · Estimated Daily Protein (g): 58 grams (1.2 grams/kg/day based on 48 kg IBW)    Nutrition Diagnosis:   · Problem: Inadequate oral intake  · Etiology: related to Insufficient energy/nutrient consumption     Signs and symptoms:  as evidenced by Diet history of poor intake, Weight loss    Objective Information:  · Nutrition-Focused Physical Findings: S/P  4/30/19: cystoscopy with insertion of bilateral stends. Pelvic mass, per MD notes concerning for neoplasm, planning TT to [address] [place] [address]. Pt reports nausea and dry heaves with poor appetite/intake for 1 week prior to admit due to antibiotics for UTI. Nausea controlled at present. Zofran available. On flagyl and maximpime. Obese.    · Wound Type: None  · Current Nutrition Therapies:  · Oral Diet Orders: General   · Oral Diet intake: 26-50%(reported per pt)  · Oral Nutrition Supplement (ONS) Orders: Standard High Calorie Oral Supplement(Ensure Ringle Brandy TID)  · ONS intake: Unable to assess  · Anthropometric Measures:  · Ht: 5' 1\" (154.9 cm)   · Current Body Wt: 183 lb (83 kg)(4/30/19 no edema)  · Admission Body Wt: 183 lb 1.6 oz (83.1 kg)(4/29/19 no edema)  · Usual Body Wt: (198# per pt 1 month ago. Per EMR: 4/16/19: 186#)  · % Weight Change:  ,  7.6% reported per pt in the last month (no EMR weight to verify), 1.6% loss in the last 2 weeks per EMR  · Ideal Body Wt: 105 lb (47.6 kg),   · BMI Classification: BMI 30.0 - 34.9 Obese Class I    Nutrition Interventions:   Continue current diet, Start ONS  Continued Inpatient Monitoring, Education Initiated, Coordination of Care(Encouraged oral intake and ONS use.)    Nutrition Evaluation:   · Evaluation: Goals set   · Goals: Pt will consume 75% or more of meals during LOS.     · Monitoring: Meal Intake, Supplement Intake, Diet Tolerance, Weight, Nausea or Vomiting, Monitor Bowel Function      Electronically signed by Les Hernandez RD, LD on 5/1/19 at 1:37 PM    Contact Number: [phone]

## 2019-05-01 NOTE — PROGRESS NOTES
[address] [facility] [address]  [address] [address]  Dept: [phone]  Loc: [phone]  Visit Date: 2019  Urology Progress Note    Chief Complaint: Abdominal Pain    Subjective:   Patient is resting in bed, voiding spontaneously , , ambulating with assistance, tolerating regular diet, denies any nausea or vomiting. There are complaints of mild pain at this time. Family is present in the room, hx obtained from patient and medical record.          Vitals:  BP (!) 97/55   Pulse 70   Temp 97.5 °F (36.4 °C) (Oral)   Resp 16   Ht 5' 1\" (1.549 m)   Wt 183 lb (83 kg)   SpO2 93%   BMI 34.58 kg/m²   Temp  Av.7 °F (36.5 °C)  Min: 96.8 °F (36 °C)  Max: 98.5 °F (36.9 °C)    Intake/Output Summary (Last 24 hours) at 2019 1043  Last data filed at 2019 6094  Gross per 24 hour   Intake 4118.24 ml   Output 975 ml   Net 3143.24 ml       Social History     Socioeconomic History    Marital status:      Spouse name: Usha Garcia Number of children: 1    Years of education: Associate Degree    Highest education level: Not on file   Occupational History    Not on file   Social Needs    Financial resource strain: Not on file    Food insecurity:     Worry: Not on file     Inability: Not on file   [employer] needs:     Medical: Not on file     Non-medical: Not on file   Tobacco Use    Smoking status: Never Smoker    Smokeless tobacco: Never Used   Substance and Sexual Activity    Alcohol use: Not Currently    Drug use: Never    Sexual activity: Not Currently   Lifestyle    Physical activity:     Days per week: Not on file     Minutes per session: Not on file    Stress: Not on file   Relationships    Social connections:     Talks on phone: Not on file     Gets together: Not on file     Attends [employer] service: Not on file     Active member of club or organization: Not on file     Attends meetings of clubs or organizations: Not on file     Relationship status: Not on file    Intimate partner violence:     Fear of current or ex partner: Not on file     Emotionally abused: Not on file     Physically abused: Not on file     Forced sexual activity: Not on file   Other Topics Concern    Not on file   Social History Narrative    Not on file     Family History   Problem Relation Age of Onset    Heart Disease Mother     Heart Disease Father      No Known Allergies    Objective:    Constitutional: Alert and oriented times x3, no acute distress, and cooperative to examination with appropriate mood and affect. HEENT:   Head:         Normocephalic and atraumatic. Mucous membranes are normal.   Eyes:         EOM are normal. No scleral icterus. Nose:    The external appearance of the nose is normal  Ears: The ears appear normal to external inspection. Cardiovascular:       Normal rate, regular rhythm. Pulmonary/Chest:  Normal respiratory rate and rhthym. No use of accessory muscles. Lungs clear bilaterally. Abdominal:          Soft. No tenderness. Active bowel sounds. Musculoskeletal:    Normal range of motion. He exhibits no edema or tenderness of lower extremities. Extremities:    No cyanosis, clubbing, or edema present. Neurological:    Alert and oriented.      Labs:  WBC:    Lab Results   Component Value Date    WBC 17.3 05/01/2019     Hemoglobin/Hematocrit:    Lab Results   Component Value Date    HGB 8.9 05/01/2019    HCT 29.5 05/01/2019     BMP:    Lab Results   Component Value Date     05/01/2019    K 4.2 05/01/2019     05/01/2019    CO2 16 05/01/2019    BUN 38 05/01/2019    LABALBU 2.4 05/01/2019    CREATININE 3.2 05/01/2019    CALCIUM 7.7 05/01/2019    LABGLOM 14 05/01/2019       Impression:  Pelvic Mass  Right Hydronephrosis  Bilateral Flank Pain  Acute Kidney Injury  Tachycardia    Plan:    POD # 1 cystoscopy, bilateral retrograde pyelograms, bilateral ureteroscopy with bilateral stent placement per

## 2019-05-02 ENCOUNTER — TELEPHONE (OUTPATIENT)
Dept: FAMILY MEDICINE CLINIC | Age: 64
End: 2019-05-02

## 2019-05-02 PROBLEM — N13.30 HYDRONEPHROSIS OF RIGHT KIDNEY: Status: ACTIVE | Noted: 2019-04-29

## 2019-05-02 NOTE — INTERVAL H&P NOTE
[address] [address]  History and Physical Update    Pt Name: Chadwick Garcia  MRN: [id number]  YOB: 1955  Date of evaluation: 5/2/2019    [] I have examined the patient and reviewed the H&P/Consult and there are no changes to the patient or plans. [x] I have examined the patient and reviewed the H&P/Consult and have noted the following changes: No changes per patient.         Kika Joseph MD  Electronically signed 5/2/2019 at 6:47 AM

## 2019-05-02 NOTE — OP NOTE
[facility] 58  RECORD OF OPERATION     PATIENT NAME: Eric Ken               MEDICAL RECORD NO. [id number]                DATE OF PROCEDURE: 04/30/2019  SURGEON: Ramona Javier MD  PRIMARY CARE PHYSICIAN: Rod Noriega MD        PREOPERATIVE DIAGNOSIS: pelvic mass with question of bilateral ureteral obstruction, renal insufficiency     POSTOPERATIVE DIAGNOSIS: same      PROCEDURE PERFORMED: cystoscopy with bilateral retrograde pyelograms, bilateral ureteroscopy, and placement of bilateral ureteral stent     SURGEON: Leticia Raman. Abby Javier MD    ASSISTANT(S): none     ANESTHESIA: general     BLOOD LOSS:  5 cc     SPECIMENS: none     COMPLICATIONS:  None immediately appreciated. DISCUSSION:  [facility] is a 75y.o.-year-old female who has a diagnosis of a large pelvic mass and a question of bilateral ureteral obstruction from the mass. After a history and physical examination was performed, potential diagnostic and therapeutic modalities were discussed with the patient. Cystoscopy with bilateral retrograde pyelograms and possible bilateral ureteroscopy with bilateral stent placement was recommended and a discussion of the risks, possible complications, possible side effects, along with the anticipated benefits were reviewed. She was given the opportunity to ask questions. Once answered, informed consent was obtained. She was brought to the operating on 04/30/2019 for this procedure. PROCEDURE: [facility] was taken to the OR and transferred to the operating room table. After initiation of general anesthesia the patient was placed in lithotomy position for cystoscopy. Her groin was prepped and draped in the standard fashion for cystoscopy. A 22 Ugandan cystoscope was passed per urethra and the right ureteral orifice was seen. A dual-flex wire was passed through the orifice and up to the kidney under fluoroscopic guidance.   The wire was left indwelling and the scope was removed. A semi-rigid ureteroscope was then passed up to the level of the mid ureter and the ureter was directly visualized. Contrast was injected through the scope and a retrograde pyelogram was performed, note below. With some hydronephrosis and hydroureter I felt that stent placement was indicated. the ureteroscope was removed. Following the removal of the scope, the wire was used to pass a 22 cm, 8 Swedish ureteral stent. A good coil was visualized in the renal pelvis and also in the bladder via fluoroscopy as the stent was placed. The distal coil was also visualized directly via the cystoscope as the bladder was drained. Attention was turned to the left. Again a wire was passed and ureteroscopy performed. Again a retrograde pyelogram was performed and a stent placed. Varsha Paul was taken out of lithotomy position after being cleaned and dried. She was transferred to the PACU in stable condition. She tolerated the procedure well, there were no complications. Right retrograde pyelogram note  Using a semi-rigid ureteroscope, a right retrograde pyelogram was performed. Contrast was injected in a retrograde fashion, up the right ureter and into the right collecting system. The ureter was evaluated in its entirety and the collecting system filled. There were no filling defects noted. The walls of both the collecting system and the ureter were smooth. The ureter was somewhat dilated as was the renal pelvis. The ureter had bulbous dilations. The catheter was removed and a delayed image demonstrated minimal progressive washout of contrast.    Impression: right hydronephrosis and hydroureter to level of distal ureter. Left retrograde pyelogram note  Using the semi-rigid ureteroscope, a left retrograde pyelogram was performed. Contrast was injected in a retrograde fashion, up the left ureter and into the left collecting system.   The ureter was evaluated in its entirety and the collecting

## 2019-05-02 NOTE — H&P
[address] [facility] [facility] [address] [facility]NAL TELEMET[facility] [address]  [address] [address]  Dept: [phone]  Loc: [phone]  Visit Date: 4/29/2019     Urology Consult Note     Reason for Consult:  Pelvic Mass, Right Hydronephrosis  Requesting Physician:  Dr. Trice Farr     History Obtained From:  patient, electronic medical record     Chief Complaint: Abdominal Pain, Nausea, UTI     HISTORY OF PRESENT ILLNESS:                 The patient is a 75 y.o. female with significant past medical history of hypothyroidism who presented with abdominal pain, nausea, bloating, and recent UTI. She was found to have acute kidney injury and a large pelvic mass and was admitted for further management. Oncology has been consulted for the pelvic mass. Urology was consulted for right hydronephrosis and acute kidney injury. The patient reports that she was being treated as outpatient for a UTI. Urine culture on 4/16/19 grew E. Coli. She was initially started on Macrobid and that made her sick so she was switched to Cipro. She reports the dysuria resolved, however, the abdominal pain increased. She also reports bilateral flank pain R>L. She denies any history of kidney stones.  She denies any gross hematuria.     Past Medical History:    Past Medical History             Diagnosis Date    Hypothyroidism      Other disorders of kidney and ureter in diseases classified elsewhere           Past Surgical History:    Past Surgical History             Procedure Laterality Date    THYROID SURGERY   02/2002     Radioactive iodine             Social History               Socioeconomic History    Marital status:        Spouse name: Shane Kitchen Number of children: 1    Years of education: Associate Degree    Highest education level: Not on file   Occupational History    Not on file   Social Needs    Financial resource strain: Not on file    Food insecurity:       Worry: Not on file       Inability: Not on file    Transportation needs:       Medical: Not on file       Non-medical: Not on file   Tobacco Use    Smoking status: Never Smoker    Smokeless tobacco: Never Used   Substance and Sexual Activity    Alcohol use: Not Currently    Drug use: Never    Sexual activity: Not Currently   Lifestyle    Physical activity:       Days per week: Not on file       Minutes per session: Not on file    Stress: Not on file   Relationships    Social connections:       Talks on phone: Not on file       Gets together: Not on file       Attends [employer] service: Not on file       Active member of club or organization: Not on file       Attends meetings of clubs or organizations: Not on file       Relationship status: Not on file    Intimate partner violence:       Fear of current or ex partner: Not on file       Emotionally abused: Not on file       Physically abused: Not on file       Forced sexual activity: Not on file   Other Topics Concern    Not on file   Social History Narrative    Not on file            AL  Family History         Family History   Problem Relation Age of Onset    Heart Disease Mother      Heart Disease Father              Allergies:  No Known Allergies     ROS:  Constitutional: Negative for chills, fatigue, fever, or weight loss. Eyes: Denies reported visual changes. ENT: Denies headache, difficulty swallowing, nose bleeds, ringing in ears, or earaches. Cardiovascular: Negative for chest pain, palpitations, tachycardia or edema. Respiratory: Denies cough or SOB. GI:The patient denies abdominal or flank pain, anorexia, nausea or vomiting. : See HPI  Musculoskeletal: Patient denies low back pain or painful or reduced ROM of the back or extremities. Neurological: The patient denies any symptoms of neurological impairment or TIA's; no history of stroke. Lymphatic: Denies swollen glands in neck, axillary or inguinal areas. Psychiatric: Denies anxiety or depression.   Skin: Denies rash or lesions. The remainder of the complete ROS is negative     PHYSICAL EXAM:  VITALS:  BP (!) 117/55   Pulse 65   Temp 98.1 °F (36.7 °C) (Oral)   Resp 18   Ht 5' 1\" (1.549 m)   Wt 183 lb (83 kg)   SpO2 96%   BMI 34.58 kg/m² . Nursing note and vitals reviewed. Constitutional:               Alert and oriented times 3, no acute distress and cooperative to examination with appropriate mood and affect. HEENT:   Head:               Normocephalic and atraumatic. Mouth/Throat:                Mucous membranes are normal.   Eyes:               EOM are normal. No scleral icterus. Nose:               The external appearance of the nose is normal  Ears: The ears appear normal to external inspection   Neck:               Supple, symmetrical, trachea midline, no adenopathy, thyroid symmetric, not enlarged and no tenderness. Cardiovascular:               Normal rate, regular rhythm, S1 S2 heart sounds. Pulmonary/Chest:              Chest symmetric with normal A/P diameter, no wheezes, rales, or rhonchi noted. Normal respiratory rate and rhthym. No use of accessory muscles. Abdominal:               Soft. No tenderness. Active bowel sounds. Genitalia: External Genitalia shows no irritation or erythema              Urethral Meatus appears to be normal in size and location              Urethra is normal with no tenderness or masses  Musculoskeletal:               Normal range of motion. She exhibits no edema or tenderness of lower extremities. Extremities:               No cyanosis, clubbing, or edema present. Neurological:               Alert and oriented. No cranial nerve deficit.  There are no focalizing motor or sensory deficits.     DATA:  CBC:         Lab Results   Component Value Date     WBC 17.0 04/30/2019     RBC 3.69 04/30/2019     HGB 9.3 04/30/2019     HCT 29.1 04/30/2019     MCV 78.9 04/30/2019     MCH 25.2 04/30/2019     MCHC 32.0 04/30/2019      04/30/2019     MPV 8.1 04/30/2019      BMP:          Lab Results   Component Value Date      04/30/2019     K 4.1 04/30/2019      04/30/2019     CO2 20 04/30/2019     BUN 39 04/30/2019     CREATININE 2.7 04/30/2019     CALCIUM 8.0 04/30/2019     LABGLOM 18 04/30/2019     GLUCOSE 107 04/30/2019      BUN/Creatinine:          Lab Results   Component Value Date     BUN 39 04/30/2019     CREATININE 2.7 04/30/2019      Magnesium:  No results found for: MG  Phosphorus:  No results found for: PHOS  PT/INR:  No results found for: PROTIME, INR  U/A:          Lab Results   Component Value Date     NITRITE positive 04/16/2019     COLORU YELLOW 04/29/2019     PHUR 5.0 04/29/2019     WBCUA > 100 04/29/2019     RBCUA 3-5 04/29/2019     YEAST NONE SEEN 04/29/2019     BACTERIA MANY 04/29/2019     CLARITYU cloudy 04/16/2019     SPECGRAV >=1.030 04/16/2019     LEUKOCYTESUR LARGE 04/29/2019     UROBILINOGEN 0.2 04/29/2019     BILIRUBINUR SMALL 04/29/2019     BILIRUBINUR small 04/16/2019     BLOODU LARGE 04/29/2019     GLUCOSEU NEGATIVE 04/29/2019         Imaging: The patient has had a CT Stone Protocol which I have independently reviewed along with its accompanying report. The study demonstrates:     Impression       1. There is a large, 15 x 15 cm lobulated poorly defined heterogeneous mass in the pelvis which could arise from the adnexa or uterus. Findings are concerning for neoplasm. Pelvic ultrasound or MRI is advised. Small amount of abdominal and pelvic    ascites.       2. Mild right hydronephrosis likely related to mechanical obstruction of the distal ureter.                **This report has been created using voice recognition software. It may contain minor errors which are inherent in voice recognition technology. **       Final report electronically signed by Dr. Deb Velasco on 4/29/2019 7:50 PM      Impression       Large, approximately 18.1 x 13.8 x 11.3 cm complex cystic mass in the posterior cul-de-sac, which appears to displace the uterus anteriorly, most likely of ovarian origin and consistent with ovarian cystadenocarcinoma until proven otherwise. If further evaluation is warranted to better clarify the relationship of the mass relative to the uterus, recommend pelvic MRI without and with gadolinium.       **This report has been created using voice recognition software. It may contain minor errors which are inherent in voice recognition technology. **        Final report electronically signed by Dr. Donna Palacios on 4/29/2019 11:23 PM          Impression       Large, approximately 18.1 x 13.8 x 11.3 cm complex cystic mass in the posterior cul-de-sac, which appears to displace the uterus anteriorly, most likely of ovarian origin and consistent with ovarian cystadenocarcinoma until proven otherwise. If further evaluation is warranted to better clarify the relationship of the mass relative to the uterus, recommend pelvic MRI without and with gadolinium.       **This report has been created using voice recognition software. It may contain minor errors which are inherent in voice recognition technology. **        Final report electronically signed by Dr. Donna Palacios on 4/29/2019 11:23 PM            IMPRESSION:      Pelvic Mass  Right Hydronephrosis  Bilateral Flank Pain  Acute Kidney Injury  Tachycardia     Plan:       Patient will be added on for surgery today. I discussed this in detail with patient, her , and daughter.     We will schedule for cystoscopy, right retrograde pyelogram, right ureteroscopy, right ureteral stent placement, left retrograde pyelogram, possible left ureteroscopy, possible left ureteral stent placement per Dr. Mcarthur Postin later today. NPO. Obtain consent.  EKG completed.           Thank you for including us in the care of [address] [place] [address], [facility]  04/30/19 9:18 AM  Urology     _____________________________________________________________________     I have seen and examined the patient independently on 04/30/2019 and I have reviewed all radiology reports and films along with pertinent laboratory values. I agree with the above assessment and plan with the following additions:        Review of Systems  No problems with ears, nose or throat. No problems with eyes. No chest pain, shortness of breath, abdominal pain, extremity pain or weakness, and no neurological deficits. No rashes. No swollen glands or lymph nodes.  symptoms per HPI. The remainder of the review of symptoms is negative.        Exam  General: alert and oriented. Cooperative. HENT: Normocephalic, Atraumatic. Eyes: No scleral icterus, mucous membranes moist.  Respiratory: Effort normal.   Skin: No rashes or obvious lesions.     Radiology  The patient has had a CT scan that I have reviewed along with its accompanying report. The study demonstrates a large pelvic mass with the ureters pushed laterally around the mass. There is mild hydronephrosis on the right.        Plan: We will go to the OR and perform bilateral retrogrades, ureteroscopy and stent placement in the hopes that this will improve drainage and her renal function.     I described the procedure in detail and also described the associated risks and benefits at length. We discussed possible alternative therapies. We discussed the risks and benefits of not undergoing therapy. Meggan Herndon understands these risks and benefits and desires to proceed.   She will be scheduled for the procedure today.         Thank you for the opportunity of allowing us to participate in Aislinn's care.        Roxanna Lola     [phone]

## 2019-05-02 NOTE — BRIEF OP NOTE
Brief Postoperative Note  ______________________________________________________________    Patient: Suzanne Banks  YOB: 1955  MRN: [id number]  Date of Procedure: 4/30/2019    Pre-Op Diagnosis: PELVIS MASS, UTI, QUESTION OF BILATERAL URETERAL OBSTRUCTION    Post-Op Diagnosis: Same       Procedure(s):  CYSTOSCOPY, BILATERAL RETROGRADE PYELOGRAM, BILATERAL URETEROSCOPY, BILATERAL URETERAL STENT PLACEMENT    Anesthesia: General    Surgeon(s):  Duran Andino MD    Assistant: none    Estimated Blood Loss (mL): 5 cc    Complications: none    Specimens:   * No specimens in log *    Implants:  Implant Name Type Inv.  Item Serial No.  Lot No. LRB No. Used   [employer] CONTUR W/O [id number] Stent:Urological STENT URET CONTLILIA W/O GW [id number]  [place] SCI: INTERVENTIONAL CARDIO  N/A 2         Drains: * No LDAs found *    Findings: bilateral ureters displaced by mass, able to pass scope but ureters collapsed by medial mass, large bilateral stents placed    Duran Andino MD

## 2019-05-05 LAB
BLOOD CULTURE, ROUTINE: NORMAL
BLOOD CULTURE, ROUTINE: NORMAL

## 2019-05-16 NOTE — DISCHARGE SUMMARY
[facility]     Discharge Summary    Patient:  Linda Herndon  YOB: 1955    MRN: [id number]   Acct: [de-identified]    Primary Care Physician: Marco A Grant MD    Admit date:  4/29/2019    Discharge date:  5/1/2019       Discharge Diagnoses:   Pelvic mass  Principal Problem:    Pelvic mass  Active Problems:    Hydronephrosis of right kidney    UTI (urinary tract infection)    Obstructive uropathy    Failure to thrive (0-17)    Ureteral obstruction  Resolved Problems:    * No resolved hospital problems. *        Admitted for: (HPI) History obtained from chart review and the patient.     The patient is a 75 y.o. female who presents from home with complaints of lower abdominal pain.     Context: Patient has apparently been unwell for about a month, when she first noted dysuria along with right RLQ pain with radiation to the suprapubic region. Her urine tested positive at the time and she was started on Macrobid, which was not well tolerated and it was changed to Cipro. The right RLQ pain persisted along with vomiting and dry heaves even while on Cipro and her PCP ordered a CT scan, but in order to avoid the delay of pre-authorization, it was suggested that she presented to the ED. She currently reports anorexia, abdominal bloating with a sense of pelvic fullness, belching and hiccups, but presumed it was due to the medication. She also reports a brief episode of blood spotting but it resolved and it was painless.     Significant findings on admission include: Cr 2.7, anion gap 21, WBC 22.5, Hb 10.9, pro-calcitonin 0.61, positive UA and CTAP findings of large 15 x 15 cm lobulated heterogenous mass in the pelvis; mild right hydronephrosis likely related to mechanical distal ureteral obstruction. Hospital Course: Patient was admitted with a primary diagnosis of Pelvic mass with associated hydronephrosis and obstructive uropathy.  She was stabilized and had cystoscopy, right retrograde pyelogram, right ureteroscopy, right ureteral stent placement, left retrograde pyelogram, left ureteroscopy and left ureteral stent placement by Urology. She was then evaluated by Oncology who recommended transfer to [facility] for specialist management. Consults: hematology/oncology and urology    Discharge Medications:       Medication List      START taking these medications    metoprolol tartrate 25 MG tablet  Commonly known as:  LOPRESSOR  Take 0.5 tablets by mouth 2 times daily        CONTINUE taking these medications    levothyroxine 88 MCG tablet  Commonly known as:  SYNTHROID        STOP taking these medications    ciprofloxacin 500 MG tablet  Commonly known as:  CIPRO           Where to Get Your Medications      These medications were sent to [address] [place] [address] [address] - [place], [address]  [facility], [address] [facility] [address]    Phone:  [phone]   · metoprolol tartrate 25 MG tablet           Vitals:  Vitals:    04/30/19 2309 05/01/19 0309 05/01/19 0815 05/01/19 1135   BP: (!) 92/54 115/62 (!) 97/55 (!) 103/58   Pulse: 69 69 70 68   Resp: 18 18 16 16   Temp: 98.2 °F (36.8 °C) 97.7 °F (36.5 °C) 97.5 °F (36.4 °C) 97.5 °F (36.4 °C)   TempSrc: Oral Oral Oral Axillary   SpO2: 93% 93% 93% 96%   Weight:       Height:         Weight: Weight: 183 lb (83 kg)     24 hour intake/output:No intake or output data in the 24 hours ending 05/16/19 1340    Discharge Exam:  General appearance: alert, appears stated age and cooperative  Lungs: clear to auscultation bilaterally  Heart: regular rate and rhythm, S1, S2 normal, no murmur, click, rub or gallop  Abdomen: Soft, non-distended, mild tenderness in RLQ, suprapubic fullness with active bowel sounds.   Extremities: extremities normal, atraumatic, no cyanosis or edema  Pulses: 2+ and symmetric  Skin: Skin color, texture, turgor normal. No rashes or lesions  Neurologic: Grossly normal    Procedures: Cystoscopy with ureteral stenting    Significant Diagnostic Studies: labs:      Radiology reports as per the Radiologist  Radiology: Ct Abdomen Pelvis Wo Contrast Additional Contrast? Radiologist Recommendation    Result Date: 4/29/2019  PROCEDURE: CT ABDOMEN PELVIS WO CONTRAST CLINICAL INFORMATION: Abdominal pain with dysuria . COMPARISON: None. TECHNIQUE: Axial 5 mm CT images were obtained through the abdomen and pelvis. No contrast was given. Coronal reconstructions were obtained. All CT scans at this facility use dose modulation, iterative reconstruction, and/or weight-based dosing when appropriate to reduce radiation dose to as low as reasonably achievable. FINDINGS: Minimal atelectasis in the lung bases. There is a small amount of abdominal and pelvic ascites. Spleen gallbladder adrenal glands liver are unremarkable. Punctate left intrarenal calculus. Mild right hydronephrosis likely related to mechanical obstruction of the distal ureter. There is a large, 15 x 15 cm lobulated poorly defined mass in the pelvis which could arise from the adnexa or uterus. Findings are concerning for neoplasm. Pelvic ultrasound or MRI is advised. Bladder is displaced anteriorly with mild wall thickening which could reflect cystitis. Presacral edema. Scattered stool in the colon. No bowel wall thickening or obstruction. Few colonic diverticuli. Appendix is not seen. No acute osseous findings. Facet hypertrophy and degenerative changes lumbar spine. Mild anterolisthesis L4 on L5 by 4 mm. 1. There is a large, 15 x 15 cm lobulated poorly defined heterogeneous mass in the pelvis which could arise from the adnexa or uterus. Findings are concerning for neoplasm. Pelvic ultrasound or MRI is advised. Small amount of abdominal and pelvic ascites. 2. Mild right hydronephrosis likely related to mechanical obstruction of the distal ureter. **This report has been created using voice recognition software.  It may contain minor errors which lie in the posterior cul-de-sac and displaces the uterus anteriorly. This is most likely of adnexal origin and is consistent with a cystadenocarcinoma until proven otherwise. Blood flow: Blood flow is present in the ovaries by color flow ultrasound. Transvaginal: Uterus:   Not well visualized as the mass in the cul-de-sac described above displaces the uterus anteriorly. Ovaries/adnexa:   Discrete ovaries were not visualized transvaginally. Mass/cyst: The mass described above the transabdominal portion of the study is again seen measuring approximately 18.1 x 11.3 cm with a large central cystic component with irregular walls and nodular excrescences projecting into the cystic component. This is again worrisome for an ovarian cystadenocarcinoma. Free fluid: none Urinary bladder: Unremarkable as visualized. Large, approximately 18.1 x 13.8 x 11.3 cm complex cystic mass in the posterior cul-de-sac, which appears to displace the uterus anteriorly, most likely of ovarian origin and consistent with ovarian cystadenocarcinoma until proven otherwise. If further evaluation is warranted to better clarify the relationship of the mass relative to the uterus, recommend pelvic MRI without and with gadolinium. **This report has been created using voice recognition software. It may contain minor errors which are inherent in voice recognition technology. ** Final report electronically signed by Dr. Renetta Barrios on 4/29/2019 11:23 PM    Xr Chest Portable    Result Date: 4/29/2019  PROCEDURE: XR CHEST PORTABLE CLINICAL INFORMATION: Abdominal pain. COMPARISON: No prior study. TECHNIQUE: Portable chest. FINDINGS: Patchy opacity in the right lung base correlate for atelectasis or infiltrate. Costophrenic recesses are preserved. Ectatic thoracic aorta. No acute osseous findings. No cardiomegaly. Patchy opacity in the right lung base correlate for atelectasis or infiltrate.  **This report has been created using voice recognition software. It may contain minor errors which are inherent in voice recognition technology. ** Final report electronically signed by Dr. Nida Martell on 4/29/2019 7:21 PM       Results for orders placed or performed during the hospital encounter of 04/29/19   CULTURE BLOOD #1   Result Value Ref Range    Blood Culture, Routine No growth-preliminary  No growth      CULTURE BLOOD #2   Result Value Ref Range    Blood Culture, Routine No growth-preliminary  No growth      Urine Culture, Reflexed   Result Value Ref Range    Organism gram positive bacilli (A)     Urine Culture Reflex       [place] count: >100,000 CFU/mL  gram positive bacilli consistent with Lactobacillus  species. If isolate is clinically significant, empiric  therapy is indicated.       Organism Candida albicans (A)     Urine Culture Reflex [place] count: 50,000-90,000 CFU/mL    Basic Metabolic Panel   Result Value Ref Range    Sodium 136 135 - 145 meq/L    Potassium 4.1 3.5 - 5.2 meq/L    Chloride 98 98 - 111 meq/L    CO2 17 (L) 23 - 33 meq/L    Glucose 132 (H) 70 - 108 mg/dL    BUN 38 (H) 7 - 22 mg/dL    CREATININE 2.6 (H) 0.4 - 1.2 mg/dL    Calcium 9.0 8.5 - 10.5 mg/dL   CBC auto differential   Result Value Ref Range    WBC 22.5 (H) 4.8 - 10.8 thou/mm3    RBC 4.30 4.20 - 5.40 mill/mm3    Hemoglobin 10.9 (L) 12.0 - 16.0 gm/dl    Hematocrit 34.0 (L) 37.0 - 47.0 %    MCV 79.1 (L) 81.0 - 99.0 fL    MCH 25.3 (L) 26.0 - 33.0 pg    MCHC 32.1 (L) 32.2 - 35.5 gm/dl    RDW-CV 14.3 11.5 - 14.5 %    RDW-SD 40.1 35.0 - 45.0 fL    Platelets 810 259 - 136 thou/mm3    MPV 8.3 (L) 9.4 - 12.4 fL    Seg Neutrophils 89.5 %    Lymphocytes 4.2 %    Monocytes 4.9 %    Eosinophils 0.1 %    Basophils 0.2 %    Immature Granulocytes 1.1 %    Segs Absolute 20.1 (H) 1.8 - 7.7 thou/mm3    Lymphocytes # 0.9 (L) 1.0 - 4.8 thou/mm3    Monocytes # 1.1 0.4 - 1.3 thou/mm3    Eosinophils # 0.0 0.0 - 0.4 thou/mm3    Basophils # 0.0 0.0 - 0.1 thou/mm3    Immature Grans (Abs) 0.24 (H) 0.00 - 0.07 Monocytes # 0.9 0.4 - 1.3 thou/mm3    Eosinophils # 0.0 0.0 - 0.4 thou/mm3    Basophils # 0.0 0.0 - 0.1 thou/mm3    Immature Grans (Abs) 0.18 (H) 0.00 - 0.07 thou/mm3    nRBC 0 /100 wbc   Hepatic Function Panel   Result Value Ref Range    Alb 2.6 (L) 3.5 - 5.1 g/dL    Total Bilirubin 0.6 0.3 - 1.2 mg/dL    Bilirubin, Direct 0.4 (H) 0.0 - 0.3 mg/dL    Alkaline Phosphatase 72 38 - 126 U/L    AST 38 5 - 40 U/L    ALT 9 (L) 11 - 66 U/L    Total Protein 5.8 (L) 6.1 - 8.0 g/dL   Urine with Reflexed Micro   Result Value Ref Range    Glucose, Ur NEGATIVE NEGATIVE mg/dl    Bilirubin Urine SMALL (A) NEGATIVE    Ketones, Urine TRACE (A) NEGATIVE    Specific Gravity, Urine 1.013 1.002 - 1.03    Blood, Urine LARGE (A) NEGATIVE    pH, UA 5.0 5.0 - 9.0    Protein, UA TRACE (A) NEGATIVE    Urobilinogen, Urine 0.2 0.0 - 1.0 eu/dl    Nitrite, Urine NEGATIVE NEGATIVE    Leukocyte Esterase, Urine LARGE (A) NEGATIVE    Color, UA YELLOW STRAW-YELL    Character, Urine CLOUDY (A) CLEAR-SL C    RBC, UA 3-5 0-2/hpf /hpf    WBC, UA > 100 0-4/hpf /hpf    Epi Cells 15-20 3-5/hpf /hpf    Bacteria, UA MANY FEW/NONE S /hpf    Casts UA 0-4 FINE GRAN NONE SEEN /lpf    Crystals NONE SEEN NONE SEEN    Renal Epithelial, Urine NONE SEEN NONE SEEN    Yeast, UA NONE SEEN NONE SEEN    CASTS 2 NONE SEEN NONE SEEN /lpf    MISCELLANEOUS 2 NONE SEEN    Bile Acids, Total   Result Value Ref Range    Ictotest NEGATIVE NEGATIVE   Lactate, Sepsis   Result Value Ref Range    Lactic Acid, Sepsis 1.3 0.5 - 1.9 mmol/L   Procalcitonin   Result Value Ref Range    Procalcitonin 0.61 (H) 0.01 - 0.09 ng/mL   Anion Gap   Result Value Ref Range    Anion Gap 16.0 8.0 - 16.0 meq/L   Glomerular Filtration Rate, Estimated   Result Value Ref Range    Est, Glom Filt Rate 18 (A) ml/min/1.73m2   Calcium, Ionized   Result Value Ref Range    Calcium, Ion 1.13 1.12 - 1.32 mmol/L   Magnesium   Result Value Ref Range    Magnesium 2.0 1.6 - 2.4 mg/dL   Phosphorus   Result Value Ref Range Phosphorus 3.3 2.4 - 4.7 mg/dL   Basic Metabolic Panel w/ Reflex to MG   Result Value Ref Range    Sodium 135 135 - 145 meq/L    Potassium reflex Magnesium 4.2 3.5 - 5.2 meq/L    Chloride 106 98 - 111 meq/L    CO2 16 (L) 23 - 33 meq/L    Glucose 113 (H) 70 - 108 mg/dL    BUN 38 (H) 7 - 22 mg/dL    CREATININE 3.2 (HH) 0.4 - 1.2 mg/dL    Calcium 7.7 (L) 8.5 - 10.5 mg/dL   CBC auto differential   Result Value Ref Range    WBC 17.3 (H) 4.8 - 10.8 thou/mm3    RBC 3.54 (L) 4.20 - 5.40 mill/mm3    Hemoglobin 8.9 (L) 12.0 - 16.0 gm/dl    Hematocrit 29.5 (L) 37.0 - 47.0 %    MCV 83.3 81.0 - 99.0 fL    MCH 25.1 (L) 26.0 - 33.0 pg    MCHC 30.2 (L) 32.2 - 35.5 gm/dl    RDW-CV 14.4 11.5 - 14.5 %    RDW-SD 43.7 35.0 - 45.0 fL    Platelets 996 610 - 936 thou/mm3    MPV 8.1 (L) 9.4 - 12.4 fL    Seg Neutrophils 94.6 %    Lymphocytes 2.4 %    Monocytes 1.8 %    Eosinophils 0.1 %    Basophils 0.2 %    Immature Granulocytes 0.9 %    Segs Absolute 16.4 (H) 1.8 - 7.7 thou/mm3    Lymphocytes # 0.4 (L) 1.0 - 4.8 thou/mm3    Monocytes # 0.3 (L) 0.4 - 1.3 thou/mm3    Eosinophils # 0.0 0.0 - 0.4 thou/mm3    Basophils # 0.0 0.0 - 0.1 thou/mm3    Immature Grans (Abs) 0.16 (H) 0.00 - 0.07 thou/mm3    nRBC 0 /100 wbc   Hepatic Function Panel   Result Value Ref Range    Alb 2.4 (L) 3.5 - 5.1 g/dL    Total Bilirubin 0.8 0.3 - 1.2 mg/dL    Bilirubin, Direct 0.5 (H) 0.0 - 0.3 mg/dL    Alkaline Phosphatase 78 38 - 126 U/L    AST 47 (H) 5 - 40 U/L    ALT 10 (L) 11 - 66 U/L    Total Protein 5.6 (L) 6.1 - 8.0 g/dL   T4, Free   Result Value Ref Range    T4 Free 1.11 0.93 - 1.76 ng/dL      Result Value Ref Range     338 (H) <38 U/mL   Anion Gap   Result Value Ref Range    Anion Gap 13.0 8.0 - 16.0 meq/L   Glomerular Filtration Rate, Estimated   Result Value Ref Range    Est, Glom Filt Rate 14 (A) ml/min/1.73m2   EKG 12 Lead   Result Value Ref Range    Ventricular Rate 90 BPM    Atrial Rate 90 BPM    P-R Interval 128 ms    QRS Duration 84 ms

## 2019-05-29 PROBLEM — N39.0 UTI (URINARY TRACT INFECTION): Status: RESOLVED | Noted: 2019-04-29 | Resolved: 2019-05-29

## (undated) DEVICE — OPEN-END FLEXI-TIP URETERAL CATHETER: Brand: FLEXI-TIP

## (undated) DEVICE — CYSTO PACK: Brand: MEDLINE INDUSTRIES, INC.

## (undated) DEVICE — GUIDEWIRE ENDOSCP L150CM DIA0.035IN TIP 3CM PTFE NIT

## (undated) DEVICE — GLOVE ORANGE PI 7   MSG9070

## (undated) DEVICE — NITINOL STONE RETRIEVAL BASKET: Brand: ZERO TIP